# Patient Record
Sex: MALE | Race: WHITE | NOT HISPANIC OR LATINO
[De-identification: names, ages, dates, MRNs, and addresses within clinical notes are randomized per-mention and may not be internally consistent; named-entity substitution may affect disease eponyms.]

---

## 2018-05-25 PROBLEM — Z00.00 ENCOUNTER FOR PREVENTIVE HEALTH EXAMINATION: Status: ACTIVE | Noted: 2018-05-25

## 2018-08-08 ENCOUNTER — APPOINTMENT (OUTPATIENT)
Dept: CARDIOLOGY | Facility: CLINIC | Age: 68
End: 2018-08-08
Payer: COMMERCIAL

## 2018-08-08 ENCOUNTER — NON-APPOINTMENT (OUTPATIENT)
Age: 68
End: 2018-08-08

## 2018-08-08 VITALS
DIASTOLIC BLOOD PRESSURE: 81 MMHG | SYSTOLIC BLOOD PRESSURE: 143 MMHG | WEIGHT: 280 LBS | RESPIRATION RATE: 16 BRPM | HEIGHT: 74 IN | HEART RATE: 84 BPM | BODY MASS INDEX: 35.94 KG/M2 | OXYGEN SATURATION: 97 %

## 2018-08-08 DIAGNOSIS — R94.31 ABNORMAL ELECTROCARDIOGRAM [ECG] [EKG]: ICD-10-CM

## 2018-08-08 DIAGNOSIS — R93.1 ABNORMAL FINDINGS ON DIAGNOSTIC IMAGING OF HEART AND CORONARY CIRCULATION: ICD-10-CM

## 2018-08-08 PROCEDURE — 93000 ELECTROCARDIOGRAM COMPLETE: CPT

## 2018-08-08 PROCEDURE — 99214 OFFICE O/P EST MOD 30 MIN: CPT

## 2018-08-28 ENCOUNTER — NON-APPOINTMENT (OUTPATIENT)
Age: 68
End: 2018-08-28

## 2018-08-28 PROBLEM — R94.31 ABNORMAL EKG: Status: ACTIVE | Noted: 2018-08-28

## 2018-08-28 PROBLEM — R93.1 ABNORMAL ECHOCARDIOGRAM: Status: ACTIVE | Noted: 2018-08-28

## 2018-10-01 ENCOUNTER — APPOINTMENT (OUTPATIENT)
Dept: CARDIOLOGY | Facility: CLINIC | Age: 68
End: 2018-10-01
Payer: COMMERCIAL

## 2018-10-01 DIAGNOSIS — R94.39 ABNORMAL RESULT OF OTHER CARDIOVASCULAR FUNCTION STUDY: ICD-10-CM

## 2018-10-01 PROCEDURE — 78452 HT MUSCLE IMAGE SPECT MULT: CPT

## 2018-10-01 PROCEDURE — 93015 CV STRESS TEST SUPVJ I&R: CPT

## 2018-10-02 PROBLEM — R94.39 ABNORMAL STRESS TEST: Status: ACTIVE | Noted: 2018-10-02

## 2018-10-23 ENCOUNTER — OUTPATIENT (OUTPATIENT)
Dept: OUTPATIENT SERVICES | Facility: HOSPITAL | Age: 68
LOS: 1 days | End: 2018-10-23
Payer: COMMERCIAL

## 2018-10-23 VITALS
SYSTOLIC BLOOD PRESSURE: 155 MMHG | OXYGEN SATURATION: 98 % | RESPIRATION RATE: 16 BRPM | HEIGHT: 74 IN | DIASTOLIC BLOOD PRESSURE: 76 MMHG | TEMPERATURE: 98 F | HEART RATE: 70 BPM | WEIGHT: 285.06 LBS

## 2018-10-23 DIAGNOSIS — Z90.89 ACQUIRED ABSENCE OF OTHER ORGANS: Chronic | ICD-10-CM

## 2018-10-23 DIAGNOSIS — Z98.890 OTHER SPECIFIED POSTPROCEDURAL STATES: Chronic | ICD-10-CM

## 2018-10-23 DIAGNOSIS — D25.9 LEIOMYOMA OF UTERUS, UNSPECIFIED: Chronic | ICD-10-CM

## 2018-10-23 DIAGNOSIS — S92.402A DISPLACED UNSPECIFIED FRACTURE OF LEFT GREAT TOE, INITIAL ENCOUNTER FOR CLOSED FRACTURE: Chronic | ICD-10-CM

## 2018-10-23 DIAGNOSIS — R94.31 ABNORMAL ELECTROCARDIOGRAM [ECG] [EKG]: ICD-10-CM

## 2018-10-23 DIAGNOSIS — H33.23 SEROUS RETINAL DETACHMENT, BILATERAL: Chronic | ICD-10-CM

## 2018-10-23 LAB
ANION GAP SERPL CALC-SCNC: 13 MMOL/L — SIGNIFICANT CHANGE UP (ref 5–17)
BUN SERPL-MCNC: 30 MG/DL — HIGH (ref 7–23)
CALCIUM SERPL-MCNC: 9.4 MG/DL — SIGNIFICANT CHANGE UP (ref 8.4–10.5)
CHLORIDE SERPL-SCNC: 103 MMOL/L — SIGNIFICANT CHANGE UP (ref 96–108)
CO2 SERPL-SCNC: 25 MMOL/L — SIGNIFICANT CHANGE UP (ref 22–31)
CREAT SERPL-MCNC: 1.3 MG/DL — SIGNIFICANT CHANGE UP (ref 0.5–1.3)
GLUCOSE SERPL-MCNC: 113 MG/DL — HIGH (ref 70–99)
HCT VFR BLD CALC: 37.1 % — LOW (ref 39–50)
HGB BLD-MCNC: 12.6 G/DL — LOW (ref 13–17)
MCHC RBC-ENTMCNC: 29 PG — SIGNIFICANT CHANGE UP (ref 27–34)
MCHC RBC-ENTMCNC: 34.1 GM/DL — SIGNIFICANT CHANGE UP (ref 32–36)
MCV RBC AUTO: 85 FL — SIGNIFICANT CHANGE UP (ref 80–100)
PLATELET # BLD AUTO: 248 K/UL — SIGNIFICANT CHANGE UP (ref 150–400)
POTASSIUM SERPL-MCNC: 4.8 MMOL/L — SIGNIFICANT CHANGE UP (ref 3.5–5.3)
POTASSIUM SERPL-SCNC: 4.8 MMOL/L — SIGNIFICANT CHANGE UP (ref 3.5–5.3)
RBC # BLD: 4.36 M/UL — SIGNIFICANT CHANGE UP (ref 4.2–5.8)
RBC # FLD: 12.9 % — SIGNIFICANT CHANGE UP (ref 10.3–14.5)
SODIUM SERPL-SCNC: 141 MMOL/L — SIGNIFICANT CHANGE UP (ref 135–145)
WBC # BLD: 9.2 K/UL — SIGNIFICANT CHANGE UP (ref 3.8–10.5)
WBC # FLD AUTO: 9.2 K/UL — SIGNIFICANT CHANGE UP (ref 3.8–10.5)

## 2018-10-23 PROCEDURE — 99152 MOD SED SAME PHYS/QHP 5/>YRS: CPT | Mod: GC

## 2018-10-23 PROCEDURE — 93458 L HRT ARTERY/VENTRICLE ANGIO: CPT | Mod: 26,GC

## 2018-10-23 PROCEDURE — C1769: CPT

## 2018-10-23 PROCEDURE — 93458 L HRT ARTERY/VENTRICLE ANGIO: CPT

## 2018-10-23 PROCEDURE — 80048 BASIC METABOLIC PNL TOTAL CA: CPT

## 2018-10-23 PROCEDURE — 99152 MOD SED SAME PHYS/QHP 5/>YRS: CPT

## 2018-10-23 PROCEDURE — 99203 OFFICE O/P NEW LOW 30 MIN: CPT

## 2018-10-23 PROCEDURE — 93010 ELECTROCARDIOGRAM REPORT: CPT

## 2018-10-23 PROCEDURE — 85027 COMPLETE CBC AUTOMATED: CPT

## 2018-10-23 PROCEDURE — C1894: CPT

## 2018-10-23 PROCEDURE — 93005 ELECTROCARDIOGRAM TRACING: CPT

## 2018-10-23 PROCEDURE — C1887: CPT

## 2018-10-23 RX ORDER — CHOLECALCIFEROL (VITAMIN D3) 125 MCG
1 CAPSULE ORAL
Qty: 0 | Refills: 0 | COMMUNITY

## 2018-10-23 RX ORDER — OXYBUTYNIN CHLORIDE 5 MG
1 TABLET ORAL
Qty: 0 | Refills: 0 | COMMUNITY

## 2018-10-23 RX ORDER — LOSARTAN POTASSIUM 100 MG/1
1 TABLET, FILM COATED ORAL
Qty: 0 | Refills: 0 | COMMUNITY

## 2018-10-23 RX ORDER — SODIUM CHLORIDE 9 MG/ML
1000 INJECTION INTRAMUSCULAR; INTRAVENOUS; SUBCUTANEOUS
Qty: 0 | Refills: 0 | Status: DISCONTINUED | OUTPATIENT
Start: 2018-10-23 | End: 2018-11-07

## 2018-10-23 RX ORDER — LATANOPROST 0.05 MG/ML
1 SOLUTION/ DROPS OPHTHALMIC; TOPICAL
Qty: 0 | Refills: 0 | COMMUNITY

## 2018-10-23 RX ORDER — AMLODIPINE BESYLATE 2.5 MG/1
1 TABLET ORAL
Qty: 0 | Refills: 0 | COMMUNITY

## 2018-10-23 RX ORDER — SODIUM CHLORIDE 9 MG/ML
1000 INJECTION INTRAMUSCULAR; INTRAVENOUS; SUBCUTANEOUS
Qty: 0 | Refills: 0 | Status: COMPLETED | OUTPATIENT
Start: 2018-10-23 | End: 2018-10-23

## 2018-10-23 RX ADMIN — SODIUM CHLORIDE 387 MILLILITER(S): 9 INJECTION INTRAMUSCULAR; INTRAVENOUS; SUBCUTANEOUS at 15:41

## 2018-10-23 NOTE — H&P CARDIOLOGY - PSH
Absence of tonsil    Detached retina, bilateral    Fibroid  tongue /removed  Fracture of left great toe    History of Mohs micrographic surgery for squamous cell carcinoma in situ (SCCIS) of skin    Status post closed reduction with internal fixation  right leg

## 2018-10-23 NOTE — H&P CARDIOLOGY - FAMILY HISTORY
Father  Still living? No  Family history of lung cancer, Age at diagnosis: Age Unknown     Mother  Still living? No  Family history of hypertension in mother, Age at diagnosis: Age Unknown

## 2018-10-23 NOTE — H&P CARDIOLOGY - HISTORY OF PRESENT ILLNESS
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 Pt went to Cardiologist Dr. Ruelas for evaluation EKG was abnormal with old anterior infarct. borderline echo findings . Pt  complaints sob with exertion walking up several flights stairs, denies any CP  no palpitations .  Pt had recent Stress test on 8/8/18 no results noted . Now presents for scheduled Left heart cardiac catheterization. 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 Pt went to Cardiologist Dr. Ruelas for evaluation EKG was abnormal with old anterior infarct, borderline echo findings . Pt  complaints sob with exertion walking up several flights stairs, denies any CP  no palpitations no lightheadedness or dizziness noted  .  Pt had recent  + Stress test on 10/1/18 showed small moderate defect apical wall reversible with normal LV function.  Now presents for scheduled Left heart cardiac catheterization. Currently CP free no sob no palpitations no dyspnea noted .

## 2019-06-20 PROBLEM — I10 ESSENTIAL (PRIMARY) HYPERTENSION: Chronic | Status: ACTIVE | Noted: 2018-10-23

## 2019-06-20 PROBLEM — C44.92 SQUAMOUS CELL CARCINOMA OF SKIN, UNSPECIFIED: Chronic | Status: ACTIVE | Noted: 2018-10-23

## 2019-06-20 PROBLEM — G62.9 POLYNEUROPATHY, UNSPECIFIED: Chronic | Status: ACTIVE | Noted: 2018-10-23

## 2019-06-24 ENCOUNTER — APPOINTMENT (OUTPATIENT)
Dept: ORTHOPEDIC SURGERY | Facility: CLINIC | Age: 69
End: 2019-06-24
Payer: COMMERCIAL

## 2019-06-24 VITALS
HEIGHT: 74 IN | HEART RATE: 71 BPM | BODY MASS INDEX: 34.39 KG/M2 | DIASTOLIC BLOOD PRESSURE: 83 MMHG | SYSTOLIC BLOOD PRESSURE: 135 MMHG | WEIGHT: 268 LBS

## 2019-06-24 DIAGNOSIS — M51.37 OTHER INTERVERTEBRAL DISC DEGENERATION, LUMBOSACRAL REGION: ICD-10-CM

## 2019-06-24 DIAGNOSIS — M48.07 SPINAL STENOSIS, LUMBOSACRAL REGION: ICD-10-CM

## 2019-06-24 DIAGNOSIS — G62.9 POLYNEUROPATHY, UNSPECIFIED: ICD-10-CM

## 2019-06-24 DIAGNOSIS — G57.91 UNSPECIFIED MONONEUROPATHY OF RIGHT LOWER LIMB: ICD-10-CM

## 2019-06-24 DIAGNOSIS — Z85.828 PERSONAL HISTORY OF OTHER MALIGNANT NEOPLASM OF SKIN: ICD-10-CM

## 2019-06-24 DIAGNOSIS — M48.062 SPINAL STENOSIS, LUMBAR REGION WITH NEUROGENIC CLAUDICATION: ICD-10-CM

## 2019-06-24 DIAGNOSIS — G57.92 UNSPECIFIED MONONEUROPATHY OF LEFT LOWER LIMB: ICD-10-CM

## 2019-06-24 DIAGNOSIS — Z80.1 FAMILY HISTORY OF MALIGNANT NEOPLASM OF TRACHEA, BRONCHUS AND LUNG: ICD-10-CM

## 2019-06-24 PROCEDURE — 72110 X-RAY EXAM L-2 SPINE 4/>VWS: CPT

## 2019-06-24 PROCEDURE — 99205 OFFICE O/P NEW HI 60 MIN: CPT

## 2019-06-24 PROCEDURE — 72170 X-RAY EXAM OF PELVIS: CPT | Mod: 59

## 2019-06-24 NOTE — PHYSICAL EXAM
[Obese] : obese [Stooped] : stooped [] : Sensory: [LE] : 5/5 motor strength in bilateral lower extremities [1+] : left 1+ [SLR] : negative straight leg raise [Plantar Reflex Right Only] : absent on the right [Plantar Reflex Left Only] : absent on the left [de-identified] : The pt is awake, alert and oriented to self, place and time, is comfortable and in no acute distress. Inspection of neck, back and lower extremities bilaterally reveals no rashes or ecchymotic lesions.  There is no obvious abnormal spinal curvature in the sagittal and coronal planes. There is no tenderness over the cervical, thoracic or lumbar spine, or the paraspinal or upper and lower extremities musculature. There is no sacroiliac tenderness. No greater trochanteric tenderness bilaterally. No atrophy or abnormal movements noted in the upper or lower extremities. There is no swelling noted in the upper or lower extremities bilaterally. No cervical lymphadenopathy noted anteriorly. No joint laxity noted in the upper and lower extremity joints bilaterally.\par Hip range of motion is degrees internal rotation 30° external rotation without pain. Full range of motion of the shoulders bilaterally with no significant pain\par There is no groin pain with hip internal rotation and a negative HARDY test bilaterally.  [DTR Reflexes Clonus Of Left Ankle (___ Beats)] : absent on the left [de-identified] : flex to his mid tibia, ext 20 degrees no pain\par cannot tandem walk straight line [DTR Reflexes Clonus Of Right Ankle (___ Beats)] : absent on the right [de-identified] : 4 views lumbar spine obtained today demonstrate no significant scoliosis. Minimal asymmetric degeneration seen on the right side at L3-4. On the lateral projection significant degeneration seen at L5-S1 with loss of disc height endplates sclerosis. The degenerative changes he also L4-5 and minimal anterolisthesis at L4-5. Between flexion-extension no dynamic instability. No acute fractures.\par \par AP pelvis demonstrates enthesopathy with bilateral iliac crest. No acute fractures. No significant degeneration

## 2019-06-24 NOTE — DISCUSSION/SUMMARY
[Medication Risks Reviewed] : Medication risks reviewed [de-identified] : The patient has had nerve conduction studies suggesting peripheral neuropathy and based on his evaluation today a suspect one of neuropathy rather than spinal stenosis with lumbar radiculopathy. However given the history of prior spinal stenosis recommend an MRI lumbar spine to further evaluate this condition. Further treatment options were discussed after the MRI has been performed. Recommended he follow up with his neurologist to discuss further treatment options pending the MRI findings.\par \par The patient was educated regarding their condition, treatment options as well as prescribed course of treatment. \par Risks and benefits as well as alternatives to the proposed treatment were also provided to the patient \par They were given the opportunity to have all their questions answered to their satisfaction.\par \par Vital signs were reviewed with the patient and the patient was instructed to followup with their primary care provider for further management.\par \par Healthy lifestyle recommendations were also made including a tobacco free lifestyle, proper diet, and weight control.

## 2019-06-24 NOTE — CONSULT LETTER
[Dear  ___] : Dear  [unfilled], [Consult Letter:] : I had the pleasure of evaluating your patient, [unfilled]. [FreeTextEntry2] : Juan Herr [FreeTextEntry1] : Thank you for this referral. I have enclosed my note for your review. Please feel free to contact my office if you have additional questions regarding this patient.\par \par Regards,\par Waqar Aguilera MD, FACS, FAAOS\par \par  of Orthopaedic Surgery\par [employer] [employer] [employer] [facility] [employer]\par Spinal Reconstruction Surgery\par Minimally Invasive Spinal Surgery\par [facility]

## 2020-11-19 ENCOUNTER — TRANSCRIPTION ENCOUNTER (OUTPATIENT)
Age: 70
End: 2020-11-19

## 2020-11-19 ENCOUNTER — INPATIENT (INPATIENT)
Facility: HOSPITAL | Age: 70
LOS: 3 days | Discharge: HOME | DRG: 418 | End: 2020-11-23
Attending: SURGERY | Admitting: SURGERY
Payer: MEDICARE

## 2020-11-19 VITALS
SYSTOLIC BLOOD PRESSURE: 174 MMHG | HEART RATE: 96 BPM | RESPIRATION RATE: 19 BRPM | DIASTOLIC BLOOD PRESSURE: 95 MMHG | TEMPERATURE: 98 F | HEIGHT: 73 IN | WEIGHT: 279.99 LBS

## 2020-11-19 DIAGNOSIS — D25.9 LEIOMYOMA OF UTERUS, UNSPECIFIED: Chronic | ICD-10-CM

## 2020-11-19 DIAGNOSIS — H33.23 SEROUS RETINAL DETACHMENT, BILATERAL: Chronic | ICD-10-CM

## 2020-11-19 DIAGNOSIS — N32.81 OVERACTIVE BLADDER: ICD-10-CM

## 2020-11-19 DIAGNOSIS — Z90.89 ACQUIRED ABSENCE OF OTHER ORGANS: Chronic | ICD-10-CM

## 2020-11-19 DIAGNOSIS — H33.23 SEROUS RETINAL DETACHMENT, BILATERAL: ICD-10-CM

## 2020-11-19 DIAGNOSIS — N18.9 CHRONIC KIDNEY DISEASE, UNSPECIFIED: ICD-10-CM

## 2020-11-19 DIAGNOSIS — Z98.890 OTHER SPECIFIED POSTPROCEDURAL STATES: Chronic | ICD-10-CM

## 2020-11-19 DIAGNOSIS — S92.402A DISPLACED UNSPECIFIED FRACTURE OF LEFT GREAT TOE, INITIAL ENCOUNTER FOR CLOSED FRACTURE: Chronic | ICD-10-CM

## 2020-11-19 DIAGNOSIS — I34.1 NONRHEUMATIC MITRAL (VALVE) PROLAPSE: ICD-10-CM

## 2020-11-19 DIAGNOSIS — Z29.9 ENCOUNTER FOR PROPHYLACTIC MEASURES, UNSPECIFIED: ICD-10-CM

## 2020-11-19 DIAGNOSIS — G62.9 POLYNEUROPATHY, UNSPECIFIED: ICD-10-CM

## 2020-11-19 DIAGNOSIS — E78.5 HYPERLIPIDEMIA, UNSPECIFIED: ICD-10-CM

## 2020-11-19 DIAGNOSIS — I10 ESSENTIAL (PRIMARY) HYPERTENSION: ICD-10-CM

## 2020-11-19 DIAGNOSIS — K81.0 ACUTE CHOLECYSTITIS: ICD-10-CM

## 2020-11-19 LAB
ALBUMIN SERPL ELPH-MCNC: 3.7 G/DL — SIGNIFICANT CHANGE UP (ref 3.3–5)
ALP SERPL-CCNC: 61 U/L — SIGNIFICANT CHANGE UP (ref 40–120)
ALT FLD-CCNC: 22 U/L — SIGNIFICANT CHANGE UP (ref 12–78)
ANION GAP SERPL CALC-SCNC: 8 MMOL/L — SIGNIFICANT CHANGE UP (ref 5–17)
APPEARANCE UR: CLEAR — SIGNIFICANT CHANGE UP
APTT BLD: 32.8 SEC — SIGNIFICANT CHANGE UP (ref 27.5–35.5)
AST SERPL-CCNC: 17 U/L — SIGNIFICANT CHANGE UP (ref 15–37)
BASOPHILS # BLD AUTO: 0.04 K/UL — SIGNIFICANT CHANGE UP (ref 0–0.2)
BASOPHILS NFR BLD AUTO: 0.2 % — SIGNIFICANT CHANGE UP (ref 0–2)
BILIRUB SERPL-MCNC: 1.8 MG/DL — HIGH (ref 0.2–1.2)
BILIRUB UR-MCNC: NEGATIVE — SIGNIFICANT CHANGE UP
BUN SERPL-MCNC: 18 MG/DL — SIGNIFICANT CHANGE UP (ref 7–23)
CALCIUM SERPL-MCNC: 9.5 MG/DL — SIGNIFICANT CHANGE UP (ref 8.5–10.1)
CHLORIDE SERPL-SCNC: 100 MMOL/L — SIGNIFICANT CHANGE UP (ref 96–108)
CO2 SERPL-SCNC: 27 MMOL/L — SIGNIFICANT CHANGE UP (ref 22–31)
COLOR SPEC: YELLOW — SIGNIFICANT CHANGE UP
CREAT SERPL-MCNC: 1.3 MG/DL — SIGNIFICANT CHANGE UP (ref 0.5–1.3)
DIFF PNL FLD: ABNORMAL
EOSINOPHIL # BLD AUTO: 0.01 K/UL — SIGNIFICANT CHANGE UP (ref 0–0.5)
EOSINOPHIL NFR BLD AUTO: 0 % — SIGNIFICANT CHANGE UP (ref 0–6)
GLUCOSE SERPL-MCNC: 146 MG/DL — HIGH (ref 70–99)
GLUCOSE UR QL: NEGATIVE — SIGNIFICANT CHANGE UP
HCT VFR BLD CALC: 39.2 % — SIGNIFICANT CHANGE UP (ref 39–50)
HGB BLD-MCNC: 13.8 G/DL — SIGNIFICANT CHANGE UP (ref 13–17)
IMM GRANULOCYTES NFR BLD AUTO: 0.9 % — SIGNIFICANT CHANGE UP (ref 0–1.5)
INR BLD: 1.29 RATIO — HIGH (ref 0.88–1.16)
KETONES UR-MCNC: NEGATIVE — SIGNIFICANT CHANGE UP
LEUKOCYTE ESTERASE UR-ACNC: NEGATIVE — SIGNIFICANT CHANGE UP
LIDOCAIN IGE QN: 80 U/L — SIGNIFICANT CHANGE UP (ref 73–393)
LYMPHOCYTES # BLD AUTO: 1.35 K/UL — SIGNIFICANT CHANGE UP (ref 1–3.3)
LYMPHOCYTES # BLD AUTO: 5.2 % — LOW (ref 13–44)
MCHC RBC-ENTMCNC: 29.2 PG — SIGNIFICANT CHANGE UP (ref 27–34)
MCHC RBC-ENTMCNC: 35.2 GM/DL — SIGNIFICANT CHANGE UP (ref 32–36)
MCV RBC AUTO: 83.1 FL — SIGNIFICANT CHANGE UP (ref 80–100)
MONOCYTES # BLD AUTO: 2.11 K/UL — HIGH (ref 0–0.9)
MONOCYTES NFR BLD AUTO: 8.1 % — SIGNIFICANT CHANGE UP (ref 2–14)
NEUTROPHILS # BLD AUTO: 22.23 K/UL — HIGH (ref 1.8–7.4)
NEUTROPHILS NFR BLD AUTO: 85.6 % — HIGH (ref 43–77)
NITRITE UR-MCNC: NEGATIVE — SIGNIFICANT CHANGE UP
NRBC # BLD: 0 /100 WBCS — SIGNIFICANT CHANGE UP (ref 0–0)
PH UR: 6.5 — SIGNIFICANT CHANGE UP (ref 5–8)
PLATELET # BLD AUTO: 292 K/UL — SIGNIFICANT CHANGE UP (ref 150–400)
POTASSIUM SERPL-MCNC: 4 MMOL/L — SIGNIFICANT CHANGE UP (ref 3.5–5.3)
POTASSIUM SERPL-SCNC: 4 MMOL/L — SIGNIFICANT CHANGE UP (ref 3.5–5.3)
PROT SERPL-MCNC: 7.6 G/DL — SIGNIFICANT CHANGE UP (ref 6–8.3)
PROT UR-MCNC: 100
PROTHROM AB SERPL-ACNC: 14.9 SEC — HIGH (ref 10.6–13.6)
RBC # BLD: 4.72 M/UL — SIGNIFICANT CHANGE UP (ref 4.2–5.8)
RBC # FLD: 13 % — SIGNIFICANT CHANGE UP (ref 10.3–14.5)
SODIUM SERPL-SCNC: 135 MMOL/L — SIGNIFICANT CHANGE UP (ref 135–145)
SP GR SPEC: 1.01 — SIGNIFICANT CHANGE UP (ref 1.01–1.02)
UROBILINOGEN FLD QL: NEGATIVE — SIGNIFICANT CHANGE UP
WBC # BLD: 25.97 K/UL — HIGH (ref 3.8–10.5)
WBC # FLD AUTO: 25.97 K/UL — HIGH (ref 3.8–10.5)

## 2020-11-19 PROCEDURE — 76705 ECHO EXAM OF ABDOMEN: CPT | Mod: 26

## 2020-11-19 PROCEDURE — 99222 1ST HOSP IP/OBS MODERATE 55: CPT | Mod: GC,AI

## 2020-11-19 PROCEDURE — 74177 CT ABD & PELVIS W/CONTRAST: CPT | Mod: 26

## 2020-11-19 PROCEDURE — 93010 ELECTROCARDIOGRAM REPORT: CPT

## 2020-11-19 PROCEDURE — 99285 EMERGENCY DEPT VISIT HI MDM: CPT

## 2020-11-19 PROCEDURE — 71045 X-RAY EXAM CHEST 1 VIEW: CPT | Mod: 26

## 2020-11-19 RX ORDER — PANTOPRAZOLE SODIUM 20 MG/1
40 TABLET, DELAYED RELEASE ORAL DAILY
Refills: 0 | Status: DISCONTINUED | OUTPATIENT
Start: 2020-11-19 | End: 2020-11-20

## 2020-11-19 RX ORDER — ONDANSETRON 8 MG/1
4 TABLET, FILM COATED ORAL EVERY 6 HOURS
Refills: 0 | Status: DISCONTINUED | OUTPATIENT
Start: 2020-11-19 | End: 2020-11-20

## 2020-11-19 RX ORDER — SODIUM CHLORIDE 9 MG/ML
1000 INJECTION INTRAMUSCULAR; INTRAVENOUS; SUBCUTANEOUS
Refills: 0 | Status: DISCONTINUED | OUTPATIENT
Start: 2020-11-19 | End: 2020-11-20

## 2020-11-19 RX ORDER — ONDANSETRON 8 MG/1
4 TABLET, FILM COATED ORAL ONCE
Refills: 0 | Status: COMPLETED | OUTPATIENT
Start: 2020-11-19 | End: 2020-11-19

## 2020-11-19 RX ORDER — LOSARTAN POTASSIUM 100 MG/1
100 TABLET, FILM COATED ORAL DAILY
Refills: 0 | Status: DISCONTINUED | OUTPATIENT
Start: 2020-11-19 | End: 2020-11-20

## 2020-11-19 RX ORDER — ACETAMINOPHEN 500 MG
1000 TABLET ORAL EVERY 6 HOURS
Refills: 0 | Status: DISCONTINUED | OUTPATIENT
Start: 2020-11-19 | End: 2020-11-20

## 2020-11-19 RX ORDER — HYDROMORPHONE HYDROCHLORIDE 2 MG/ML
0.5 INJECTION INTRAMUSCULAR; INTRAVENOUS; SUBCUTANEOUS EVERY 4 HOURS
Refills: 0 | Status: DISCONTINUED | OUTPATIENT
Start: 2020-11-19 | End: 2020-11-20

## 2020-11-19 RX ORDER — PIPERACILLIN AND TAZOBACTAM 4; .5 G/20ML; G/20ML
3.38 INJECTION, POWDER, LYOPHILIZED, FOR SOLUTION INTRAVENOUS EVERY 8 HOURS
Refills: 0 | Status: DISCONTINUED | OUTPATIENT
Start: 2020-11-20 | End: 2020-11-20

## 2020-11-19 RX ORDER — SODIUM CHLORIDE 9 MG/ML
1000 INJECTION INTRAMUSCULAR; INTRAVENOUS; SUBCUTANEOUS ONCE
Refills: 0 | Status: COMPLETED | OUTPATIENT
Start: 2020-11-19 | End: 2020-11-19

## 2020-11-19 RX ORDER — LATANOPROST 0.05 MG/ML
1 SOLUTION/ DROPS OPHTHALMIC; TOPICAL AT BEDTIME
Refills: 0 | Status: DISCONTINUED | OUTPATIENT
Start: 2020-11-19 | End: 2020-11-20

## 2020-11-19 RX ORDER — PIPERACILLIN AND TAZOBACTAM 4; .5 G/20ML; G/20ML
3.38 INJECTION, POWDER, LYOPHILIZED, FOR SOLUTION INTRAVENOUS ONCE
Refills: 0 | Status: COMPLETED | OUTPATIENT
Start: 2020-11-19 | End: 2020-11-19

## 2020-11-19 RX ORDER — AMLODIPINE BESYLATE 2.5 MG/1
5 TABLET ORAL DAILY
Refills: 0 | Status: DISCONTINUED | OUTPATIENT
Start: 2020-11-19 | End: 2020-11-20

## 2020-11-19 RX ORDER — HYDROMORPHONE HYDROCHLORIDE 2 MG/ML
1 INJECTION INTRAMUSCULAR; INTRAVENOUS; SUBCUTANEOUS EVERY 4 HOURS
Refills: 0 | Status: DISCONTINUED | OUTPATIENT
Start: 2020-11-19 | End: 2020-11-20

## 2020-11-19 RX ORDER — OXYBUTYNIN CHLORIDE 5 MG
10 TABLET ORAL DAILY
Refills: 0 | Status: DISCONTINUED | OUTPATIENT
Start: 2020-11-19 | End: 2020-11-20

## 2020-11-19 RX ORDER — MORPHINE SULFATE 50 MG/1
4 CAPSULE, EXTENDED RELEASE ORAL ONCE
Refills: 0 | Status: DISCONTINUED | OUTPATIENT
Start: 2020-11-19 | End: 2020-11-19

## 2020-11-19 RX ADMIN — SODIUM CHLORIDE 75 MILLILITER(S): 9 INJECTION INTRAMUSCULAR; INTRAVENOUS; SUBCUTANEOUS at 22:42

## 2020-11-19 RX ADMIN — PIPERACILLIN AND TAZOBACTAM 3.38 GRAM(S): 4; .5 INJECTION, POWDER, LYOPHILIZED, FOR SOLUTION INTRAVENOUS at 21:43

## 2020-11-19 RX ADMIN — ONDANSETRON 4 MILLIGRAM(S): 8 TABLET, FILM COATED ORAL at 18:45

## 2020-11-19 RX ADMIN — Medication 1000 MILLIGRAM(S): at 22:40

## 2020-11-19 RX ADMIN — SODIUM CHLORIDE 1000 MILLILITER(S): 9 INJECTION INTRAMUSCULAR; INTRAVENOUS; SUBCUTANEOUS at 18:43

## 2020-11-19 RX ADMIN — LOSARTAN POTASSIUM 100 MILLIGRAM(S): 100 TABLET, FILM COATED ORAL at 22:40

## 2020-11-19 RX ADMIN — LATANOPROST 1 DROP(S): 0.05 SOLUTION/ DROPS OPHTHALMIC; TOPICAL at 22:40

## 2020-11-19 RX ADMIN — Medication 1000 MILLIGRAM(S): at 23:10

## 2020-11-19 RX ADMIN — SODIUM CHLORIDE 1000 MILLILITER(S): 9 INJECTION INTRAMUSCULAR; INTRAVENOUS; SUBCUTANEOUS at 19:45

## 2020-11-19 RX ADMIN — PIPERACILLIN AND TAZOBACTAM 200 GRAM(S): 4; .5 INJECTION, POWDER, LYOPHILIZED, FOR SOLUTION INTRAVENOUS at 21:13

## 2020-11-19 RX ADMIN — MORPHINE SULFATE 4 MILLIGRAM(S): 50 CAPSULE, EXTENDED RELEASE ORAL at 18:43

## 2020-11-19 NOTE — H&P ADULT - NSHPPHYSICALEXAM_GEN_ALL_CORE
PHYSICAL EXAM:  General: NAD, resting on stretcher  Head: Atraumatic, normocephalic  Neck: Supple  Heart: RRR. +S1/S2. No murmur, gallop, rub  Lungs: CTA B/L. Good inspiratory effort. No wheeze, rhonchi, rales.   Abdomen: Soft, protuberant. Very tender to palpation in RUQ. No rebound tenderness or guarding. +Haider's sign. +BS.  Ext: No calf tenderness b/l.  Neuro: A&Ox3, nonfocal. PHYSICAL EXAM:  General: NAD, resting on stretcher  Head: Atraumatic, normocephalic  Neck: Supple  Heart: RRR. +S1/S2.   Lungs: CTA B/L. Good inspiratory effort. No wheeze, rhonchi, rales.   Abdomen: Soft, protuberant. Very tender to palpation in RUQ. No rebound tenderness or guarding. +Haider's sign. +BS.  Ext: No calf tenderness b/l.  Neuro: A&Ox3, nonfocal.

## 2020-11-19 NOTE — ED ADULT NURSE NOTE - OBJECTIVE STATEMENT
Pt to Ed c/o right upper abdominal pain, denies N/V, last bowel movement yesterday, afebrile, skin intact.

## 2020-11-19 NOTE — ED ADULT TRIAGE NOTE - NS ED NURSE DIRECT TO ROOM YN
"  [facility]  Adult Nutrition  Consult Note    SUMMARY     Recommendations    Recommendation/Intervention: 1. Should pt be cleared for po diet, recommend renal diet with texture per SLP along with Novasource ONS TID. 2. Should pt require enteral feeding, initiate Novasource renal formula at 10ml/hr and advance 10ml Q4hrs to goal rate of 40ml/hr (provides 1920kcal, 87g pro, 691ml free water). Provide additional 500ml water flush/24 hrs. Hold for residuals >500ml.  Goals: 1. Pt to receive nutrition < 48 hrs.  Nutrition Goal Status: new  Communication of RD Recs: other (comment)(POC)    Reason for Assessment    Reason for Assessment: consult  Diagnosis: other (see comments)(GI bleed)  Relevant Medical History: CVA, uncontrolled HTN, ESRD on HD, DM2, chronic HF  General Information Comments: Pt s/p bloody emesis failed CRESENCIO, is NPO. Pt reports eating poorly PTA. Per chart, pt has experienced 24% (39 lb) wt loss over the last year. Pt unable to explain why he lost the wt; per pt, he just recently started on dialysis. Pt unaware of dietary restrictions in renal diet; provided basic education but pt could use reinforcement. Completed NFPE: pt has moderate muscle wasting of interosseous, temporal, clavicle, calves and quadriceps as well as overall subcutaneous fat loss, moderate triceps fat loss and protruding patellas.  Nutrition Discharge Planning: unable to assess at this time    Nutrition Risk Screen    Nutrition Risk Screen: no indicators present    Nutrition/Diet History    Do you have any cultural, spiritual, Amish conflicts, given your current situation?: none    Anthropometrics    Temp: 96.6 °F (35.9 °C)  Height Method: Stated  Height: 5' 6" (167.6 cm)  Height (inches): 66 in  Weight Method: Standard Scale  Weight: 55.3 kg (122 lb)  Weight (lb): 122 lb  Ideal Body Weight (IBW), Male: 142 lb  % Ideal Body Weight, Male (lb): 85.92 lb  BMI (Calculated): 19.7  Amputation %: 5.9  Total Amputation " %: 5.9       Lab/Procedures/Meds    Pertinent Labs Reviewed: reviewed  Pertinent Labs Comments: Cl 94, CO2 31, BUN 38, Creat 7.1, eGFR 9.2, Glu 132  Pertinent Medications Reviewed: reviewed  Pertinent Medications Comments: statin, famotidine, PPI, senna/colace, sevelamer carbonate    Physical Findings/Assessment    Overall Physical Appearance: loss of muscle mass, loss of subcutaneous fat, weak, amputee(left BKA)    Estimated/Assessed Needs    Weight Used For Calorie Calculations: 55.3 kg (121 lb 14.6 oz)  Energy Calorie Requirements (kcal): 1936  Energy Need Method: Kcal/kg(35 kcal/kg)  Protein Requirements: 83-94g  Weight Used For Protein Calculations: 55.3 kg (121 lb 14.6 oz)  RDA Method (mL): 1936  CHO Requirement: 50% total kcal      Nutrition Prescription Ordered    Current Diet Order: NPO    Evaluation of Received Nutrient/Fluid Intake    Energy Calories Required: not meeting needs  Protein Required: not meeting needs  % Intake of Estimated Energy Needs: 50 - 75 %  % Meal Intake: NPO    Nutrition Risk    Level of Risk/Frequency of Follow-up: high     Assessment and Plan  Severe Malnutrition in the context of Social/Environmental Circumstances    Related to (etiology):  Unknown etiology    Signs and Symptoms (as evidenced by):  Energy Intake: per pt, <75% intake over the last year  Body Fat Depletion: overall subcutaneous fat loss, moderate triceps fat loss and protruding patellas.  Muscle Mass Depletion:  moderate muscle wasting of interosseous, temporal, clavicle, calves and quadriceps  Weight Loss: 24% (39 lbs) x 1 year    Nutrition Diagnosis Status:  New     Monitor and Evaluation    Food and Nutrient Intake: energy intake, food and beverage intake, enteral nutrition intake  Food and Nutrient Adminstration: diet order, enteral and parenteral nutrition administration  Knowledge/Beliefs/Attitudes: food and nutrition knowledge/skill  Physical Activity and Function: factors affecting access to physical  activity  Anthropometric Measurements: weight, weight change, body mass index  Biochemical Data, Medical Tests and Procedures: electrolyte and renal panel, gastrointestinal profile, glucose/endocrine profile, inflammatory profile, lipid profile  Nutrition-Focused Physical Findings: overall appearance, extremities, muscles and bones, head and eyes, skin     Nutrition Follow-Up    RD Follow-up?: Yes     Yes

## 2020-11-19 NOTE — H&P ADULT - NSHPSOCIALHISTORY_GEN_ALL_CORE
Denies smoking or history of smoking. Admits to ETOH use occasionally/socially. Denies illicit drug use.

## 2020-11-19 NOTE — H&P ADULT - NSHPLABSRESULTS_GEN_ALL_CORE
LABS:                      13.8   25.97 )-----------( 292      ( 2020 18:34 )             39.2     135  |  100  |  18  ----------------------------<  146<H>  4.0   |  27  |  1.30    Ca    9.5      2020 18:34  TPro  7.6  /  Alb  3.7  /  TBili  1.8<H>  /  DBili  x   /  AST  17  /  ALT  22  /  AlkPhos  61  11-      Urinalysis Basic - ( 2020 20:38 )  Color: Yellow / Appearance: Clear / S.015 / pH: x  Gluc: x / Ketone: Negative  / Bili: Negative / Urobili: Negative   Blood: x / Protein: 100 / Nitrite: Negative   Leuk Esterase: Negative / RBC: 6-10 /HPF / WBC 0-2   Sq Epi: x / Non Sq Epi: Occasional / Bacteria: Occasional      RADIOLOGY/IMAGING:    < from: CT Abdomen and Pelvis w/ IV Cont (20 @ 21:03)  EXAM:  CT ABDOMEN AND PELVIS IC                        PROCEDURE DATE:  2020    INTERPRETATION:  CLINICAL INFORMATION: 70 years old. Male. RUQ pain, U/S poss cholecystitis.  COMPARISON: None.  PROCEDURE:  CT of the Abdomen and Pelvis was performed with intravenous contrast.  Intravenous contrast: 90 ml Omnipaque 350. 10 ml discarded.  Oral contrast: None.  Sagittal and coronal reformats were performed.    FINDINGS:  LOWER CHEST: Mild bibasilar atelectasis.  LIVER: Within normal limits.  BILE DUCTS: Normal caliber.  GALLBLADDER: Distended gallbladder containing calcified gallstones and with mild gallbladder wall thickening, pericholecystic fat stranding and trace pericholecystic fluid.  SPLEEN: Within normal limits.  PANCREAS: Within normal limits.  ADRENALS: Within normal limits.  KIDNEYS/URETERS: Enhance symmetrically. No hydronephrosis. Left renal cyst.  BLADDER: Within normal limits.  REPRODUCTIVE ORGANS: Prostate is not enlarged.  BOWEL: No bowel obstruction. Appendix is not definitively visualized.  PERITONEUM: No ascites.  VESSELS: Normal caliber abdominal aorta.  RETROPERITONEUM/LYMPH NODES: No lymphadenopathy.  ABDOMINAL WALL: Within normal limits.  BONES: Degenerative changes in the spine.    IMPRESSION:  Findings concerning for acute cholecystitis.    JENNYFER JACKSON MD; Attending Radiologist  This document has been electronically signed. 2020  9:13PM  < end of copied text >    < from: US Gallbladder (US Gallbladder .) (20 @ 18:56) >  EXAM:  US GALLBLADDER                        PROCEDURE DATE:  2020    INTERPRETATION:  Clinical data: Right upper quadrant pain.  COMPARISON: None.  TECHNIQUE: Sonography of the right upper quadrant.    FINDINGS:  Bile ducts: Normal caliber.  Gallbladder: Distended with debris and possible small calculi. Mildly thickened wall. Unable to assess for focal tenderness due to premedication.    IMPRESSION:  Findings suggestive of acute cholecystitis. Suggest confirmation with cholescintigraphy    HANSEL DC MD; Attending Radiologist  This document has been electronically signed. 2020  7:03PM

## 2020-11-19 NOTE — ED PROVIDER NOTE - OBJECTIVE STATEMENT
69 y/o M with hx of HTN, trace MVP, overactive bladder presents with c/o abdominal pain x 2 days. States that he had dinner on Tuesday night and few hours after started having abdominal pain and vomiting, took peptobismol and symptoms resolved by yesterday morning. States that he was pain free all day yesterday until 8:30pm last night when he started having upper abdominal pain which has been constant, feels "gassy", no other symptoms. Denies any N/V/D, CP, hx of abdominal surgeries, dysuria, fever, chills, recent travel, sick contacts. Last BM: yesterday.   pcp: Dr. Juan Herr

## 2020-11-19 NOTE — CONSULT NOTE ADULT - PROBLEM SELECTOR RECOMMENDATION 2
/95 in ER   continue home losartan 100 mg QD, amlodipine 5 mg QD with hold parameters /95 in ER , elevation likely multifactorial -   continue home losartan 100 mg QD, amlodipine 5 mg QD with hold parameters /95 in ER , elevation likely multifactorial - pt due for losartan dose. also anxious regarding possible need for surgery. give stat losartan now   continue home losartan 100 mg QD, amlodipine 5 mg QD with hold parameters

## 2020-11-19 NOTE — CONSULT NOTE ADULT - ASSESSMENT
Patient is a 71 y/o M with hx of HTN, trace MVP, overactive bladder, b/l detached retina repair, partial neuropathy in bilateral feet ( due to hx spinal compression), HLD admit for cholecystitis.

## 2020-11-19 NOTE — H&P ADULT - NSHPREVIEWOFSYSTEMS_GEN_ALL_CORE
REVIEW OF SYSTEMS:  CONSTITUTIONAL: No weakness, fevers or chills  EYES/ENT: No visual changes;  No vertigo or throat pain   NECK: No pain or stiffness  RESPIRATORY: No cough, wheezing, hemoptysis; No shortness of breath  CARDIOVASCULAR: No chest pain or palpitations  GASTROINTESTINAL: See HPI.   GENITOURINARY: No dysuria, frequency or hematuria  NEUROLOGICAL: No numbness or weakness  SKIN: No itching, burning, rashes, or lesions   All other review of systems is negative unless indicated above.

## 2020-11-19 NOTE — CONSULT NOTE ADULT - PROBLEM SELECTOR RECOMMENDATION 8
dvt ppx as per surgery   IMPROVE VTE Individual Risk Assessment        RISK                                                          Points  [  ] Previous VTE                                                3  [  ] Thrombophilia                                             2  [  ] Lower limb paralysis                                   2        (unable to hold up >15 seconds)    [  ] Current Cancer                                            2         (within 6 months)  [  ] Immobilization > 24 hrs                              1  [  ] ICU/CCU stay > 24 hours                            1  [ x ] Age > 60                                                    1  IMPROVE VTE Score ___1______ CKD stage 3 a  monitor CMP
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
## 2020-11-19 NOTE — CONSULT NOTE ADULT - PROBLEM SELECTOR RECOMMENDATION 3
/95 in ER   continue home losartan 100 mg QD, amlodipine 5 mg QD with hold parameters overactive bladder   continue home oxybutynin 10 mg ER qD

## 2020-11-19 NOTE — CONSULT NOTE ADULT - PROBLEM SELECTOR RECOMMENDATION 9
acute cholecystis  NPO after midnight acute cholecystis, wbc 25.9, 85% neutrophils, BR total 1.8  s/p zosyn IV x1, NS 1L bolus x1, morphine 4 mg IV x1, zofran 4 mg IV x1  CT abd and pelvis and US gb suggestive of acute cholecystitis  recommend NPO after midnight  recommend cotninue  zosyn 3.375 mg IV q 8 hrs  recommend zofran 4 mg IV q 6 hrs   recommend IVF rate 120 cc/hr while NPO   morphine 2 mg IV q 6 for moderate pain, morphine 4 mg IV q 6 hours for severe pain dvt ppx as per surgery   IMPROVE VTE Individual Risk Assessment        RISK                                                          Points  [  ] Previous VTE                                                3  [  ] Thrombophilia                                             2  [  ] Lower limb paralysis                                   2        (unable to hold up >15 seconds)    [  ] Current Cancer                                            2         (within 6 months)  [  ] Immobilization > 24 hrs                              1  [  ] ICU/CCU stay > 24 hours                            1  [ x ] Age > 60                                                    1  IMPROVE VTE Score ___1______ acute cholecystis, wbc 25.9, 85% neutrophils, BR total 1.8  s/p zosyn IV x1, NS 1L bolus x1, morphine 4 mg IV x1, zofran 4 mg IV x1  CT abd and pelvis and US gb suggestive of acute cholecystitis  NPO after midnight, zosyn 3.375 mg IV q 8 hrs,  zofran 4 mg IV q 6 hrs , IVF rate 120 cc/hr while NPO   morphine 2 mg IV q 6 for moderate pain, morphine 4 mg IV q 6 hours for severe pain  pt with no known history of ischemic disease. Appears euvolemic on exam and has no known history of heart failure. He has h/o trace mitral regurg. EKG NSR with no ischemic changes. Can perform >4MET. RCRI class 1 risk. Medically optimized to proceed with planned surgery pending adequate bp control. acute cholecystis, wbc 25.9, 85% neutrophils, BR total 1.8  s/p zosyn IV x1, NS 1L bolus x1, morphine 4 mg IV x1, zofran 4 mg IV x1  CT abd and pelvis and US gb suggestive of acute cholecystitis  NPO after midnight, zosyn 3.375 mg IV q 8 hrs,  zofran 4 mg IV q 6 hrs , IVF rate 120 cc/hr while NPO   morphine 2 mg IV q 6 for moderate pain, morphine 4 mg IV q 6 hours for severe pain  pt with no known history of ischemic disease. Appears euvolemic on exam and has no known history of heart failure. He has h/o MVP. EKG NSR with no ischemic changes. Can perform >4MET. RCRI class 1 risk. Medically optimized to proceed with planned surgery pending adequate bp control.

## 2020-11-19 NOTE — ED PROVIDER NOTE - CLINICAL SUMMARY MEDICAL DECISION MAKING FREE TEXT BOX
71 yo M with hx of htn co abdominal pain and vomiting 2 nights ago, resolved yesterday morning, and then started having constant upper abdominal pain last night, no n/v/d since yesterday, +ttp ruq and epigastric region, will get labs, UA, IVF, pain meds, gallbladder US r/o cholecystitis, possible CT scan abdomen and pelvis, reassess

## 2020-11-19 NOTE — ED PROVIDER NOTE - CROS ED ROS STATEMENT
----- Message from SAVANA Mendiola CNP sent at 1/7/2020  6:35 PM EST -----  Small benign appearing lymph node noted in axilla. Please let patient know results are benign.
Attempted to reach patient in regards to results/plan of care. Patient did not answer, voicemail left, awaiting call back.
all other ROS negative except as per HPI
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
## 2020-11-19 NOTE — H&P ADULT - NSICDXPASTMEDICALHX_GEN_ALL_CORE_FT
PAST MEDICAL HISTORY:  HTN (hypertension)     Overactive bladder     Peripheral neuropathy     SCC (squamous cell carcinoma)
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
## 2020-11-19 NOTE — ED PROVIDER NOTE - PROGRESS NOTE DETAILS
Paged surgeon, Dr. Healy, awaiting call back. Spoke to surgery PA, discussed with Dr. Healy, advised to get medicine consult, will need ct abdomen and pelvis and will discuss which service for admission after ct results. Spoke with surgical PA, Kim, advised to admit to surgery to Dr. Niraj scott.

## 2020-11-19 NOTE — CONSULT NOTE ADULT - PROBLEM SELECTOR RECOMMENDATION 6
hx of detached retina b/l, s/p repair  conitne home Latanoprost eye drops stable   takes COQ at home, no other medications  recommend lipid panel

## 2020-11-19 NOTE — H&P ADULT - HISTORY OF PRESENT ILLNESS
71 y/o M with PMHx of HTN, overactive bladder presents with abdominal pain x 2 days. Pain located in RUQ and described as constant ache with intermittent sharp pains, 7/10 in severity at its worst, nonradiating. Pain is currently 3/10 after pain medication given in ER. Patient states he overate on Tuesday night and vomited afterwards. He took Pepto-Bismol, which he reports improved his symptoms. On Wednesday, patient ate a very bland diet, but pain returned at 8:30pm. Last BM was yesterday and normal - no flatus or BM today. Patient reports feeling bloated and like he has "gas and gas pains." Denies fever, chills, chest pain, palpitations, shortness of breath, current nausea, urinary complaints. 71 y/o M with PMHx of HTN, overactive bladder, MVP, peripheral neuropathy, hx of bilateral detached retinas, presents with abdominal pain x 2 days. Pain located in RUQ and described as constant ache with intermittent sharp pains, 7/10 in severity at its worst, nonradiating. Pain is currently 3/10 after pain medication given in ER. Patient states he overate on Tuesday night and vomited afterwards. He took Pepto-Bismol, which he reports improved his symptoms. On Wednesday, patient ate a very bland diet, but pain returned at 8:30pm. Last BM was yesterday and normal - no flatus or BM today. Patient reports feeling bloated and like he has "gas and gas pains." Denies fever, chills, chest pain, palpitations, shortness of breath, current nausea, urinary complaints.

## 2020-11-19 NOTE — ED PROVIDER NOTE - ATTENDING CONTRIBUTION TO CARE
I have personally performed a face to face diagnostic evaluation on this patient.  I have reviewed the PA note and agree with the history, exam, and plan of care, except as noted.  History and Exam by me shows  ruq and epigastric pain x 2 days.  No fever.  pt is in nad.  abd- soft, pos bach. wbc 24.8, sono cw acute cholecystitis.  Admit to Dr. Healy

## 2020-11-19 NOTE — H&P ADULT - NSICDXPASTSURGICALHX_GEN_ALL_CORE_FT
PAST SURGICAL HISTORY:  Absence of tonsil     Detached retina, bilateral     Fibroid tongue /removed    Fracture of left great toe     History of Mohs micrographic surgery for squamous cell carcinoma in situ (SCCIS) of skin     Status post closed reduction with internal fixation right leg

## 2020-11-19 NOTE — H&P ADULT - ASSESSMENT
ASSESSMENT:   71 y/o M with PMHx of HTN, overactive bladder presents with RUQ abdominal pain x 2 days, CT and US show findings consistent with acute cholecystitis, WBC 25, Tbili 1.8, afebrile, VSS      PLAN:  -Admit to surgical service  -Pain control, supportive care, OOB  -IV Zosyn  -NPO, IV fluids  -SCDs  -Labs in AM, trend WBC, Tbili/LFTs  -Hospitalist consult for medical management and OR clearance   -Possible OR for laparoscopic cholecystectomy   -Discussed with Dr. Healy ASSESSMENT:   71 y/o M with PMHx of HTN, overactive bladder, MVP, peripheral neuropathy, hx of bilateral detached retinas, presents with RUQ abdominal pain x 2 days, CT and US show findings consistent with acute cholecystitis, WBC 25, Tbili 1.8, afebrile, VSS      PLAN:  -Admit to surgical service  -Pain control, supportive care, OOB  -IV Zosyn  -NPO, IV fluids  -SCDs  -Labs in AM, trend WBC, Tbili/LFTs  -Hospitalist consult for medical management and OR clearance   -Possible OR for laparoscopic cholecystectomy   -Discussed with Dr. Healy

## 2020-11-20 ENCOUNTER — RESULT REVIEW (OUTPATIENT)
Age: 70
End: 2020-11-20

## 2020-11-20 DIAGNOSIS — Z01.818 ENCOUNTER FOR OTHER PREPROCEDURAL EXAMINATION: ICD-10-CM

## 2020-11-20 LAB
ALBUMIN SERPL ELPH-MCNC: 3.2 G/DL — LOW (ref 3.3–5)
ALP SERPL-CCNC: 56 U/L — SIGNIFICANT CHANGE UP (ref 40–120)
ALT FLD-CCNC: 17 U/L — SIGNIFICANT CHANGE UP (ref 12–78)
ANION GAP SERPL CALC-SCNC: 6 MMOL/L — SIGNIFICANT CHANGE UP (ref 5–17)
AST SERPL-CCNC: 14 U/L — LOW (ref 15–37)
BILIRUB SERPL-MCNC: 1.7 MG/DL — HIGH (ref 0.2–1.2)
BUN SERPL-MCNC: 18 MG/DL — SIGNIFICANT CHANGE UP (ref 7–23)
CALCIUM SERPL-MCNC: 8.7 MG/DL — SIGNIFICANT CHANGE UP (ref 8.5–10.1)
CHLORIDE SERPL-SCNC: 103 MMOL/L — SIGNIFICANT CHANGE UP (ref 96–108)
CO2 SERPL-SCNC: 29 MMOL/L — SIGNIFICANT CHANGE UP (ref 22–31)
CREAT SERPL-MCNC: 1.4 MG/DL — HIGH (ref 0.5–1.3)
GLUCOSE SERPL-MCNC: 143 MG/DL — HIGH (ref 70–99)
HCT VFR BLD CALC: 38.1 % — LOW (ref 39–50)
HCV AB S/CO SERPL IA: 0.1 S/CO — SIGNIFICANT CHANGE UP (ref 0–0.99)
HCV AB SERPL-IMP: SIGNIFICANT CHANGE UP
HGB BLD-MCNC: 12.8 G/DL — LOW (ref 13–17)
MCHC RBC-ENTMCNC: 28.7 PG — SIGNIFICANT CHANGE UP (ref 27–34)
MCHC RBC-ENTMCNC: 33.6 GM/DL — SIGNIFICANT CHANGE UP (ref 32–36)
MCV RBC AUTO: 85.4 FL — SIGNIFICANT CHANGE UP (ref 80–100)
NRBC # BLD: 0 /100 WBCS — SIGNIFICANT CHANGE UP (ref 0–0)
PLATELET # BLD AUTO: 228 K/UL — SIGNIFICANT CHANGE UP (ref 150–400)
POTASSIUM SERPL-MCNC: 4.3 MMOL/L — SIGNIFICANT CHANGE UP (ref 3.5–5.3)
POTASSIUM SERPL-SCNC: 4.3 MMOL/L — SIGNIFICANT CHANGE UP (ref 3.5–5.3)
PROT SERPL-MCNC: 6.8 G/DL — SIGNIFICANT CHANGE UP (ref 6–8.3)
RBC # BLD: 4.46 M/UL — SIGNIFICANT CHANGE UP (ref 4.2–5.8)
RBC # FLD: 13 % — SIGNIFICANT CHANGE UP (ref 10.3–14.5)
SARS-COV-2 IGG SERPL QL IA: NEGATIVE — SIGNIFICANT CHANGE UP
SARS-COV-2 IGM SERPL IA-ACNC: <0.1 INDEX — SIGNIFICANT CHANGE UP
SARS-COV-2 RNA SPEC QL NAA+PROBE: SIGNIFICANT CHANGE UP
SODIUM SERPL-SCNC: 138 MMOL/L — SIGNIFICANT CHANGE UP (ref 135–145)
WBC # BLD: 24.84 K/UL — HIGH (ref 3.8–10.5)
WBC # FLD AUTO: 24.84 K/UL — HIGH (ref 3.8–10.5)

## 2020-11-20 PROCEDURE — 99222 1ST HOSP IP/OBS MODERATE 55: CPT

## 2020-11-20 PROCEDURE — 47562 LAPAROSCOPIC CHOLECYSTECTOMY: CPT | Mod: 22

## 2020-11-20 PROCEDURE — 99223 1ST HOSP IP/OBS HIGH 75: CPT | Mod: 57

## 2020-11-20 PROCEDURE — 47562 LAPAROSCOPIC CHOLECYSTECTOMY: CPT | Mod: AS,22

## 2020-11-20 PROCEDURE — 99233 SBSQ HOSP IP/OBS HIGH 50: CPT

## 2020-11-20 PROCEDURE — 88304 TISSUE EXAM BY PATHOLOGIST: CPT | Mod: 26

## 2020-11-20 RX ORDER — OXYCODONE HYDROCHLORIDE 5 MG/1
5 TABLET ORAL EVERY 4 HOURS
Refills: 0 | Status: DISCONTINUED | OUTPATIENT
Start: 2020-11-20 | End: 2020-11-22

## 2020-11-20 RX ORDER — HYDROMORPHONE HYDROCHLORIDE 2 MG/ML
0.5 INJECTION INTRAMUSCULAR; INTRAVENOUS; SUBCUTANEOUS
Refills: 0 | Status: DISCONTINUED | OUTPATIENT
Start: 2020-11-20 | End: 2020-11-20

## 2020-11-20 RX ORDER — HYDROMORPHONE HYDROCHLORIDE 2 MG/ML
1 INJECTION INTRAMUSCULAR; INTRAVENOUS; SUBCUTANEOUS
Refills: 0 | Status: DISCONTINUED | OUTPATIENT
Start: 2020-11-20 | End: 2020-11-22

## 2020-11-20 RX ORDER — ONDANSETRON 8 MG/1
4 TABLET, FILM COATED ORAL ONCE
Refills: 0 | Status: DISCONTINUED | OUTPATIENT
Start: 2020-11-20 | End: 2020-11-20

## 2020-11-20 RX ORDER — SODIUM CHLORIDE 9 MG/ML
1000 INJECTION, SOLUTION INTRAVENOUS
Refills: 0 | Status: DISCONTINUED | OUTPATIENT
Start: 2020-11-20 | End: 2020-11-20

## 2020-11-20 RX ORDER — PANTOPRAZOLE SODIUM 20 MG/1
40 TABLET, DELAYED RELEASE ORAL DAILY
Refills: 0 | Status: DISCONTINUED | OUTPATIENT
Start: 2020-11-20 | End: 2020-11-22

## 2020-11-20 RX ORDER — SODIUM CHLORIDE 9 MG/ML
1000 INJECTION, SOLUTION INTRAVENOUS
Refills: 0 | Status: DISCONTINUED | OUTPATIENT
Start: 2020-11-20 | End: 2020-11-21

## 2020-11-20 RX ORDER — ACETAMINOPHEN 500 MG
650 TABLET ORAL EVERY 6 HOURS
Refills: 0 | Status: DISCONTINUED | OUTPATIENT
Start: 2020-11-20 | End: 2020-11-22

## 2020-11-20 RX ORDER — INFLUENZA VIRUS VACCINE 15; 15; 15; 15 UG/.5ML; UG/.5ML; UG/.5ML; UG/.5ML
0.5 SUSPENSION INTRAMUSCULAR ONCE
Refills: 0 | Status: DISCONTINUED | OUTPATIENT
Start: 2020-11-20 | End: 2020-11-23

## 2020-11-20 RX ORDER — SENNA PLUS 8.6 MG/1
2 TABLET ORAL AT BEDTIME
Refills: 0 | Status: DISCONTINUED | OUTPATIENT
Start: 2020-11-20 | End: 2020-11-23

## 2020-11-20 RX ORDER — PIPERACILLIN AND TAZOBACTAM 4; .5 G/20ML; G/20ML
3.38 INJECTION, POWDER, LYOPHILIZED, FOR SOLUTION INTRAVENOUS EVERY 8 HOURS
Refills: 0 | Status: DISCONTINUED | OUTPATIENT
Start: 2020-11-20 | End: 2020-11-23

## 2020-11-20 RX ORDER — ENOXAPARIN SODIUM 100 MG/ML
40 INJECTION SUBCUTANEOUS DAILY
Refills: 0 | Status: DISCONTINUED | OUTPATIENT
Start: 2020-11-21 | End: 2020-11-23

## 2020-11-20 RX ORDER — OXYCODONE HYDROCHLORIDE 5 MG/1
5 TABLET ORAL ONCE
Refills: 0 | Status: DISCONTINUED | OUTPATIENT
Start: 2020-11-20 | End: 2020-11-20

## 2020-11-20 RX ADMIN — PIPERACILLIN AND TAZOBACTAM 25 GRAM(S): 4; .5 INJECTION, POWDER, LYOPHILIZED, FOR SOLUTION INTRAVENOUS at 06:32

## 2020-11-20 RX ADMIN — PIPERACILLIN AND TAZOBACTAM 25 GRAM(S): 4; .5 INJECTION, POWDER, LYOPHILIZED, FOR SOLUTION INTRAVENOUS at 23:00

## 2020-11-20 RX ADMIN — AMLODIPINE BESYLATE 5 MILLIGRAM(S): 2.5 TABLET ORAL at 06:32

## 2020-11-20 RX ADMIN — SENNA PLUS 2 TABLET(S): 8.6 TABLET ORAL at 23:00

## 2020-11-20 NOTE — PROGRESS NOTE ADULT - PROBLEM SELECTOR PLAN 2
pt is medically optimized at this time, considered a intermediate risk candidate for a low to intermediate risk procedure, apprec cardio optimization given mvp hx and periopertive monitoring

## 2020-11-20 NOTE — CONSULT NOTE ADULT - SUBJECTIVE AND OBJECTIVE BOX
Patient is a 70y old  Male who presents with a chief complaint of acute cholecystitis (2020 08:29)    Charting in progress    HPI:  71 y/o M with PMHx of HTN, overactive bladder, MVP, peripheral neuropathy, hx of bilateral detached retinas, presents with abdominal pain x 2 days. Pain located in RUQ and described as constant ache with intermittent sharp pains, 7/10 in severity at its worst, nonradiating. Pain is currently 3/10 after pain medication given in ER. Patient states he overate on Tuesday night and vomited afterwards. He took Pepto-Bismol, which he reports improved his symptoms. On Wednesday, patient ate a very bland diet, but pain returned at 8:30pm. Last BM was yesterday and normal - no flatus or BM today. Patient reports feeling bloated and like he has "gas and gas pains." Denies fever, chills, chest pain, palpitations, shortness of breath, current nausea, urinary complaints.  (2020 21:43)      PAST MEDICAL & SURGICAL HISTORY:  Overactive bladder    SCC (squamous cell carcinoma)    Peripheral neuropathy    HTN (hypertension)    Fibroid  tongue /removed    Fracture of left great toe    Status post closed reduction with internal fixation  right leg    Absence of tonsil    Detached retina, bilateral    History of Mohs micrographic surgery for squamous cell carcinoma in situ (SCCIS) of skin              ECHO  FINDINGS:  2018:  Mild concentric left ventricular hypertrophy. Normal global systolic function. LVEF is 60-65% by visual estimation. There are no significant regional wall motion abnormalities. Grade 1 diastolic dysfunction.    MEDICATIONS  (STANDING):  amLODIPine   Tablet 5 milliGRAM(s) Oral daily  influenza   Vaccine 0.5 milliLiter(s) IntraMuscular once  latanoprost 0.005% Ophthalmic Solution 1 Drop(s) Both EYES at bedtime  losartan 100 milliGRAM(s) Oral daily  oxybutynin 10 milliGRAM(s) Oral daily  pantoprazole  Injectable 40 milliGRAM(s) IV Push daily  piperacillin/tazobactam IVPB.. 3.375 Gram(s) IV Intermittent every 8 hours  sodium chloride 0.9%. 1000 milliLiter(s) (100 mL/Hr) IV Continuous <Continuous>    MEDICATIONS  (PRN):  acetaminophen   Tablet .. 1000 milliGRAM(s) Oral every 6 hours PRN Mild Pain (1 - 3)  HYDROmorphone  Injectable 1 milliGRAM(s) IV Push every 4 hours PRN Severe Pain (7 - 10)  HYDROmorphone  Injectable 0.5 milliGRAM(s) IV Push every 4 hours PRN Moderate Pain (4 - 6)  ondansetron Injectable 4 milliGRAM(s) IV Push every 6 hours PRN Nausea and/or Vomiting      FAMILY HISTORY:  Family history of hypertension in mother (Mother)    Family history of lung cancer (Father)          ROS negative except as noted above      SOCIAL HISTORY:    never smoker, etoh socially, denies other drug use    Vital Signs Last 24 Hrs  T(C): 36.8 (2020 06:03), Max: 37.2 (2020 21:55)  T(F): 98.3 (2020 06:03), Max: 98.9 (2020 21:55)  HR: 77 (2020 06:03) (77 - 96)  BP: 154/72 (2020 06:03) (147/79 - 174/95)  BP(mean): --  RR: 16 (2020 06:03) (16 - 19)  SpO2: 94% (2020 06:03) (94% - 96%)    Physical Exam:  General: Well developed, well nourished, NAD  HEENT: NCAT, EOMI bl  Neurology: A&Ox3, nonfocal, sensation intact   Respiratory: CTA B/L, No W/R/R  CV: RRR, +S1/S2, no murmurs, rubs or gallops  Abdominal: Soft, NT, ND +BSx4, no palpable masses  Extremities: No C/C/E, + peripheral pulses  MSK: Normal ROM, no joint erythema or warmth, no joint swelling   Heme: No obvious ecchymosis or petechiae   Skin: warm, dry, normal color      ECG:    I&O's Detail      LABS:                        12.8   24.84 )-----------( 228      ( 2020 05:01 )             38.1     11-    138  |  103  |  18  ----------------------------<  143<H>  4.3   |  29  |  1.40<H>    Ca    8.7      2020 05:01    TPro  6.8  /  Alb  3.2<L>  /  TBili  1.7<H>  /  DBili  x   /  AST  14<L>  /  ALT  17  /  AlkPhos  56  11-20        PT/INR - ( 2020 20:15 )   PT: 14.9 sec;   INR: 1.29 ratio         PTT - ( 2020 20:15 )  PTT:32.8 sec  Urinalysis Basic - ( 2020 20:38 )    Color: Yellow / Appearance: Clear / S.015 / pH: x  Gluc: x / Ketone: Negative  / Bili: Negative / Urobili: Negative   Blood: x / Protein: 100 / Nitrite: Negative   Leuk Esterase: Negative / RBC: 6-10 /HPF / WBC 0-2   Sq Epi: x / Non Sq Epi: Occasional / Bacteria: Occasional      I&O's Summary    BNP  RADIOLOGY & ADDITIONAL STUDIES:  Cardiac cath lab results 2018:  VENTRICLES: No left ventriculogram was performed.  CORONARY VESSELS: The coronary circulation is right dominant.  LM:   --  LM: Normal.  LAD:   --  LAD: Angiography showed minor luminal irregularities with no  flow limiting lesions.  CX:   --  Circumflex: Angiography showed minor luminal irregularities with  no flow limiting lesions.  --  OM1: Angiography showed minor luminal irregularities with no flow  limiting lesions.  RCA:   --  Mid RCA: There was a diffuse 30 % stenosis.  COMPLICATIONS: There were no complications.  DIAGNOSTIC IMPRESSIONS: Mild CAD.  DIAGNOSTIC RECOMMENDATIONS: Medical therapy.   Patient is a 70y old  Male who presents with a chief complaint of acute cholecystitis (2020 08:29)    Charting in progress    HPI:  69 y/o M with PMHx of HTN, overactive bladder, MVP, peripheral neuropathy, hx of bilateral detached retinas, presents with abdominal pain x 2 days. Pain located in RUQ and described as constant ache with intermittent sharp pains, 7/10 in severity at its worst, nonradiating. Pain is currently 3/10 after pain medication given in ER. Patient states he overate on Tuesday night and vomited afterwards. He took Pepto-Bismol, which he reports improved his symptoms. On Wednesday, patient ate a very bland diet, but pain returned at 8:30pm. Last BM was yesterday and normal - no flatus or BM today. Patient reports feeling bloated and like he has "gas and gas pains." Denies fever, chills, chest pain, palpitations, shortness of breath, current nausea, urinary complaints.  (2020 21:43)      PAST MEDICAL & SURGICAL HISTORY:  Overactive bladder    SCC (squamous cell carcinoma)    Peripheral neuropathy    HTN (hypertension)    Fibroid  tongue /removed    Fracture of left great toe    Status post closed reduction with internal fixation  right leg    Absence of tonsil    Detached retina, bilateral    History of Mohs micrographic surgery for squamous cell carcinoma in situ (SCCIS) of skin              ECHO  FINDINGS:  2018:  Mild concentric left ventricular hypertrophy. Normal global systolic function. LVEF is 60-65% by visual estimation. There are no significant regional wall motion abnormalities. Grade 1 diastolic dysfunction.    MEDICATIONS  (STANDING):  amLODIPine   Tablet 5 milliGRAM(s) Oral daily  influenza   Vaccine 0.5 milliLiter(s) IntraMuscular once  latanoprost 0.005% Ophthalmic Solution 1 Drop(s) Both EYES at bedtime  losartan 100 milliGRAM(s) Oral daily  oxybutynin 10 milliGRAM(s) Oral daily  pantoprazole  Injectable 40 milliGRAM(s) IV Push daily  piperacillin/tazobactam IVPB.. 3.375 Gram(s) IV Intermittent every 8 hours  sodium chloride 0.9%. 1000 milliLiter(s) (100 mL/Hr) IV Continuous <Continuous>    MEDICATIONS  (PRN):  acetaminophen   Tablet .. 1000 milliGRAM(s) Oral every 6 hours PRN Mild Pain (1 - 3)  HYDROmorphone  Injectable 1 milliGRAM(s) IV Push every 4 hours PRN Severe Pain (7 - 10)  HYDROmorphone  Injectable 0.5 milliGRAM(s) IV Push every 4 hours PRN Moderate Pain (4 - 6)  ondansetron Injectable 4 milliGRAM(s) IV Push every 6 hours PRN Nausea and/or Vomiting      FAMILY HISTORY:  Family history of hypertension in mother (Mother)    Family history of lung cancer (Father)          ROS negative except as noted above      SOCIAL HISTORY:    never smoker, etoh socially, denies other drug use    Vital Signs Last 24 Hrs  T(C): 36.8 (2020 06:03), Max: 37.2 (2020 21:55)  T(F): 98.3 (2020 06:03), Max: 98.9 (2020 21:55)  HR: 77 (2020 06:03) (77 - 96)  BP: 154/72 (2020 06:03) (147/79 - 174/95)  BP(mean): --  RR: 16 (2020 06:03) (16 - 19)  SpO2: 94% (2020 06:03) (94% - 96%)    Physical Exam:  General: Well developed, well nourished, NAD  HEENT: NCAT, EOMI bl  Neurology: A&Ox3, nonfocal, sensation intact   Respiratory: CTA B/L, No W/R/R  CV: RRR, +S1/S2, no murmurs, rubs or gallops  Abdominal: Soft, tender to palpation, no palpable masses  Extremities: No C/C/E, + peripheral pulses  MSK: Normal ROM, no joint erythema or warmth, no joint swelling   Heme: No obvious ecchymosis or petechiae   Skin: warm, dry, normal color      ECG: NSR    I&O's Detail      LABS:                        12.8   24.84 )-----------( 228      ( 2020 05:01 )             38.1     11-    138  |  103  |  18  ----------------------------<  143<H>  4.3   |  29  |  1.40<H>    Ca    8.7      2020 05:01    TPro  6.8  /  Alb  3.2<L>  /  TBili  1.7<H>  /  DBili  x   /  AST  14<L>  /  ALT  17  /  AlkPhos  56  11-20        PT/INR - ( 2020 20:15 )   PT: 14.9 sec;   INR: 1.29 ratio         PTT - ( 2020 20:15 )  PTT:32.8 sec  Urinalysis Basic - ( 2020 20:38 )    Color: Yellow / Appearance: Clear / S.015 / pH: x  Gluc: x / Ketone: Negative  / Bili: Negative / Urobili: Negative   Blood: x / Protein: 100 / Nitrite: Negative   Leuk Esterase: Negative / RBC: 6-10 /HPF / WBC 0-2   Sq Epi: x / Non Sq Epi: Occasional / Bacteria: Occasional      I&O's Summary    BNP  RADIOLOGY & ADDITIONAL STUDIES:  Cardiac cath lab results 2018:  VENTRICLES: No left ventriculogram was performed.  CORONARY VESSELS: The coronary circulation is right dominant.  LM:   --  LM: Normal.  LAD:   --  LAD: Angiography showed minor luminal irregularities with no  flow limiting lesions.  CX:   --  Circumflex: Angiography showed minor luminal irregularities with  no flow limiting lesions.  --  OM1: Angiography showed minor luminal irregularities with no flow  limiting lesions.  RCA:   --  Mid RCA: There was a diffuse 30 % stenosis.  COMPLICATIONS: There were no complications.  DIAGNOSTIC IMPRESSIONS: Mild CAD.  DIAGNOSTIC RECOMMENDATIONS: Medical therapy.   Patient is a 70y old  Male who presents with a chief complaint of acute cholecystitis (2020 08:29)    HPI:  69 y/o M with PMHx of HTN, overactive bladder, MVP, peripheral neuropathy, hx of bilateral detached retinas, presents with abdominal pain x 2 days. Pain located in RUQ and described as constant ache with intermittent sharp pains, 7/10 in severity at its worst, nonradiating. Pain is currently 3/10 after pain medication given in ER. Patient states he overate on Tuesday night and vomited afterwards. He took Pepto-Bismol, which he reports improved his symptoms. On Wednesday, patient ate a very bland diet, but pain returned at 8:30pm. Last BM was yesterday and normal - no flatus or BM today. Patient reports feeling bloated and like he has "gas and gas pains." Denies fever, chills, chest pain, palpitations, shortness of breath, current nausea, urinary complaints.  (2020 21:43)      PAST MEDICAL & SURGICAL HISTORY:  Overactive bladder    SCC (squamous cell carcinoma)    Peripheral neuropathy    HTN (hypertension)    Fibroid  tongue /removed    Fracture of left great toe    Status post closed reduction with internal fixation  right leg    Absence of tonsil    Detached retina, bilateral    History of Mohs micrographic surgery for squamous cell carcinoma in situ (SCCIS) of skin              ECHO  FINDINGS:  2018:  Mild concentric left ventricular hypertrophy. Normal global systolic function. LVEF is 60-65% by visual estimation. There are no significant regional wall motion abnormalities. Grade 1 diastolic dysfunction.    MEDICATIONS  (STANDING):  amLODIPine   Tablet 5 milliGRAM(s) Oral daily  influenza   Vaccine 0.5 milliLiter(s) IntraMuscular once  latanoprost 0.005% Ophthalmic Solution 1 Drop(s) Both EYES at bedtime  losartan 100 milliGRAM(s) Oral daily  oxybutynin 10 milliGRAM(s) Oral daily  pantoprazole  Injectable 40 milliGRAM(s) IV Push daily  piperacillin/tazobactam IVPB.. 3.375 Gram(s) IV Intermittent every 8 hours  sodium chloride 0.9%. 1000 milliLiter(s) (100 mL/Hr) IV Continuous <Continuous>    MEDICATIONS  (PRN):  acetaminophen   Tablet .. 1000 milliGRAM(s) Oral every 6 hours PRN Mild Pain (1 - 3)  HYDROmorphone  Injectable 1 milliGRAM(s) IV Push every 4 hours PRN Severe Pain (7 - 10)  HYDROmorphone  Injectable 0.5 milliGRAM(s) IV Push every 4 hours PRN Moderate Pain (4 - 6)  ondansetron Injectable 4 milliGRAM(s) IV Push every 6 hours PRN Nausea and/or Vomiting      FAMILY HISTORY:  Family history of hypertension in mother (Mother)    Family history of lung cancer (Father)          ROS negative except as noted above      SOCIAL HISTORY:    never smoker, etoh socially, denies other drug use    Vital Signs Last 24 Hrs  T(C): 36.8 (2020 06:03), Max: 37.2 (2020 21:55)  T(F): 98.3 (2020 06:03), Max: 98.9 (2020 21:55)  HR: 77 (2020 06:03) (77 - 96)  BP: 154/72 (2020 06:03) (147/79 - 174/95)  BP(mean): --  RR: 16 (2020 06:03) (16 - 19)  SpO2: 94% (2020 06:03) (94% - 96%)    Physical Exam:  General: Well developed, well nourished, NAD  HEENT: NCAT, EOMI bl  Neurology: A&Ox3, nonfocal, sensation intact   Respiratory: CTA B/L, No W/R/R  CV: RRR, +S1/S2, no murmurs, rubs or gallops  Abdominal: Soft, tender to palpation, no palpable masses  Extremities: No C/C/E, + peripheral pulses  MSK: Normal ROM, no joint erythema or warmth, no joint swelling   Heme: No obvious ecchymosis or petechiae   Skin: warm, dry, normal color      ECG: NSR    I&O's Detail      LABS:                        12.8   24.84 )-----------( 228      ( 2020 05:01 )             38.1     11-    138  |  103  |  18  ----------------------------<  143<H>  4.3   |  29  |  1.40<H>    Ca    8.7      2020 05:01    TPro  6.8  /  Alb  3.2<L>  /  TBili  1.7<H>  /  DBili  x   /  AST  14<L>  /  ALT  17  /  AlkPhos  56  11-20        PT/INR - ( 2020 20:15 )   PT: 14.9 sec;   INR: 1.29 ratio         PTT - ( 2020 20:15 )  PTT:32.8 sec  Urinalysis Basic - ( 2020 20:38 )    Color: Yellow / Appearance: Clear / S.015 / pH: x  Gluc: x / Ketone: Negative  / Bili: Negative / Urobili: Negative   Blood: x / Protein: 100 / Nitrite: Negative   Leuk Esterase: Negative / RBC: 6-10 /HPF / WBC 0-2   Sq Epi: x / Non Sq Epi: Occasional / Bacteria: Occasional      I&O's Summary    BNP  RADIOLOGY & ADDITIONAL STUDIES:  Cardiac cath lab results 2018:  VENTRICLES: No left ventriculogram was performed.  CORONARY VESSELS: The coronary circulation is right dominant.  LM:   --  LM: Normal.  LAD:   --  LAD: Angiography showed minor luminal irregularities with no  flow limiting lesions.  CX:   --  Circumflex: Angiography showed minor luminal irregularities with  no flow limiting lesions.  --  OM1: Angiography showed minor luminal irregularities with no flow  limiting lesions.  RCA:   --  Mid RCA: There was a diffuse 30 % stenosis.  COMPLICATIONS: There were no complications.  DIAGNOSTIC IMPRESSIONS: Mild CAD.  DIAGNOSTIC RECOMMENDATIONS: Medical therapy.   Patient is a 70y old  Male who presents with a chief complaint of acute cholecystitis (2020 08:29)    HPI:  69 y/o M with PMHx of HTN, overactive bladder, MVP, peripheral neuropathy, hx of bilateral detached retinas, presents with abdominal pain x 2 days. Pain located in RUQ and described as constant ache with intermittent sharp pains, 7/10 in severity at its worst, nonradiating. Pain is currently 3/10 after pain medication given in ER. Patient states he overate on Tuesday night and vomited afterwards. He took Pepto-Bismol, which he reports improved his symptoms. On Wednesday, patient ate a very bland diet, but pain returned at 8:30pm. Last BM was yesterday and normal - no flatus or BM today. Patient reports feeling bloated and like he has "gas and gas pains." Denies fever, chills, chest pain, palpitations, shortness of breath, current nausea, urinary complaints.         PAST MEDICAL & SURGICAL HISTORY:  Overactive bladder    SCC (squamous cell carcinoma)    Peripheral neuropathy    HTN (hypertension)    Fibroid  tongue /removed    Fracture of left great toe    Status post closed reduction with internal fixation  right leg    Absence of tonsil    Detached retina, bilateral    History of Mohs micrographic surgery for squamous cell carcinoma in situ (SCCIS) of skin              ECHO  FINDINGS:  2018:  Mild concentric left ventricular hypertrophy. Normal global systolic function. LVEF is 60-65% by visual estimation. There are no significant regional wall motion abnormalities. Grade 1 diastolic dysfunction.    MEDICATIONS  (STANDING):  amLODIPine   Tablet 5 milliGRAM(s) Oral daily  influenza   Vaccine 0.5 milliLiter(s) IntraMuscular once  latanoprost 0.005% Ophthalmic Solution 1 Drop(s) Both EYES at bedtime  losartan 100 milliGRAM(s) Oral daily  oxybutynin 10 milliGRAM(s) Oral daily  pantoprazole  Injectable 40 milliGRAM(s) IV Push daily  piperacillin/tazobactam IVPB.. 3.375 Gram(s) IV Intermittent every 8 hours  sodium chloride 0.9%. 1000 milliLiter(s) (100 mL/Hr) IV Continuous <Continuous>    MEDICATIONS  (PRN):  acetaminophen   Tablet .. 1000 milliGRAM(s) Oral every 6 hours PRN Mild Pain (1 - 3)  HYDROmorphone  Injectable 1 milliGRAM(s) IV Push every 4 hours PRN Severe Pain (7 - 10)  HYDROmorphone  Injectable 0.5 milliGRAM(s) IV Push every 4 hours PRN Moderate Pain (4 - 6)  ondansetron Injectable 4 milliGRAM(s) IV Push every 6 hours PRN Nausea and/or Vomiting      FAMILY HISTORY:  Family history of hypertension in mother (Mother)    Family history of lung cancer (Father)          ROS negative except as noted above      SOCIAL HISTORY:    never smoker, etoh socially, denies other drug use    Vital Signs Last 24 Hrs  T(C): 36.8 (2020 06:03), Max: 37.2 (2020 21:55)  T(F): 98.3 (2020 06:03), Max: 98.9 (2020 21:55)  HR: 77 (2020 06:03) (77 - 96)  BP: 154/72 (2020 06:03) (147/79 - 174/95)  BP(mean): --  RR: 16 (2020 06:03) (16 - 19)  SpO2: 94% (2020 06:03) (94% - 96%)    Physical Exam:  General: Well developed, well nourished, NAD  HEENT: NCAT, EOMI bl  Neurology: A&Ox3, nonfocal, sensation intact   Respiratory: CTA B/L, No W/R/R  CV: RRR, +S1/S2, no murmurs, rubs or gallops  Abdominal: Soft, tender to palpation, no palpable masses  Extremities: No C/C/E, + peripheral pulses  MSK: Normal ROM, no joint erythema or warmth, no joint swelling   Heme: No obvious ecchymosis or petechiae   Skin: warm, dry, normal color      ECG: NSR    I&O's Detail      LABS:                        12.8   24.84 )-----------( 228      ( 2020 05:01 )             38.1     11-    138  |  103  |  18  ----------------------------<  143<H>  4.3   |  29  |  1.40<H>    Ca    8.7      2020 05:01    TPro  6.8  /  Alb  3.2<L>  /  TBili  1.7<H>  /  DBili  x   /  AST  14<L>  /  ALT  17  /  AlkPhos  56  11-20        PT/INR - ( 2020 20:15 )   PT: 14.9 sec;   INR: 1.29 ratio         PTT - ( 2020 20:15 )  PTT:32.8 sec  Urinalysis Basic - ( 2020 20:38 )    Color: Yellow / Appearance: Clear / S.015 / pH: x  Gluc: x / Ketone: Negative  / Bili: Negative / Urobili: Negative   Blood: x / Protein: 100 / Nitrite: Negative   Leuk Esterase: Negative / RBC: 6-10 /HPF / WBC 0-2   Sq Epi: x / Non Sq Epi: Occasional / Bacteria: Occasional      I&O's Summary    BNP  RADIOLOGY & ADDITIONAL STUDIES:  Cardiac cath lab results 2018:  VENTRICLES: No left ventriculogram was performed.  CORONARY VESSELS: The coronary circulation is right dominant.  LM:   --  LM: Normal.  LAD:   --  LAD: Angiography showed minor luminal irregularities with no  flow limiting lesions.  CX:   --  Circumflex: Angiography showed minor luminal irregularities with  no flow limiting lesions.  --  OM1: Angiography showed minor luminal irregularities with no flow  limiting lesions.  RCA:   --  Mid RCA: There was a diffuse 30 % stenosis.  COMPLICATIONS: There were no complications.  DIAGNOSTIC IMPRESSIONS: Mild CAD.  DIAGNOSTIC RECOMMENDATIONS: Medical therapy.

## 2020-11-20 NOTE — PROGRESS NOTE ADULT - SUBJECTIVE AND OBJECTIVE BOX
Post Operative Note  Patient: GOLDBERGER, MARTIN 70y (1950) Male   MRN: [id number]  Location: [address]  Visit: 11-19-20 Inpatient  Date: 11-20-20 @ 17:45    Procedure: s/p lap partial cholecystectomy,     Subjective:   69 y/o M seen and examined at bedside. Pt offers no complaints at this time in NAD. PT currently NPO with RODRIGUEZ drain in place, gomez in place. PT denies any fevers, chills, chest pain, shortness of breath,  nausea, vomiting or diarrhea.    Objective:  Vitals: T(F): 98.1 (11-20-20 @ 16:00), Max: 99.9 (11-20-20 @ 10:13)  HR: 72 (11-20-20 @ 16:00)  BP: 122/57 (11-20-20 @ 16:00) (121/72 - 174/95)  RR: 12 (11-20-20 @ 16:00)  SpO2: 96% (11-20-20 @ 16:00)    In:   11-20-20 @ 07:01  -  11-20-20 @ 17:45  --------------------------------------------------------  IN: 100 mL      IV Fluids: lactated ringers. 1000 milliLiter(s) (75 mL/Hr) IV Continuous <Continuous>  lactated ringers. 1000 milliLiter(s) (125 mL/Hr) IV Continuous <Continuous>    PHYSICAL EXAM:  GENERAL: NAD, well-developed  HEAD:  Atraumatic, Normocephalic  EYES: EOMI, PERRLA, conjunctiva and sclera clear  CHEST/LUNG: CTABL, no ronchi, rales or wheezing  HEART: RRR, no MRGs  ABDOMEN: Soft, protuberant, nondistended, +bs, incisional sites c/d/i, RODRIGUEZ drain in place with nonbilious, serosanguinous output, gomez in place, mild incisional ttp,   EXTREMITIES:  2+ Peripheral Pulses, No clubbing, cyanosis, or edema  PSYCH: AAOx3  NEUROLOGY: non-focal  SKIN: No rashes or lesions    Medications: [Standing]  acetaminophen   Tablet .. 650 milliGRAM(s) Oral every 6 hours PRN  HYDROmorphone  Injectable 1 milliGRAM(s) IV Push every 3 hours PRN  HYDROmorphone  Injectable 0.5 milliGRAM(s) IV Push every 10 minutes PRN  influenza   Vaccine 0.5 milliLiter(s) IntraMuscular once  lactated ringers. 1000 milliLiter(s) IV Continuous <Continuous>  lactated ringers. 1000 milliLiter(s) IV Continuous <Continuous>  ondansetron Injectable 4 milliGRAM(s) IV Push once PRN  oxyCODONE    IR 5 milliGRAM(s) Oral once PRN  oxyCODONE    IR 5 milliGRAM(s) Oral every 4 hours PRN  pantoprazole  Injectable 40 milliGRAM(s) IV Push daily  piperacillin/tazobactam IVPB.. 3.375 Gram(s) IV Intermittent every 8 hours  senna 2 Tablet(s) Oral at bedtime    Medications: [PRN]  acetaminophen   Tablet .. 650 milliGRAM(s) Oral every 6 hours PRN  HYDROmorphone  Injectable 1 milliGRAM(s) IV Push every 3 hours PRN  HYDROmorphone  Injectable 0.5 milliGRAM(s) IV Push every 10 minutes PRN  influenza   Vaccine 0.5 milliLiter(s) IntraMuscular once  lactated ringers. 1000 milliLiter(s) IV Continuous <Continuous>  lactated ringers. 1000 milliLiter(s) IV Continuous <Continuous>  ondansetron Injectable 4 milliGRAM(s) IV Push once PRN  oxyCODONE    IR 5 milliGRAM(s) Oral once PRN  oxyCODONE    IR 5 milliGRAM(s) Oral every 4 hours PRN  pantoprazole  Injectable 40 milliGRAM(s) IV Push daily  piperacillin/tazobactam IVPB.. 3.375 Gram(s) IV Intermittent every 8 hours  senna 2 Tablet(s) Oral at bedtime    Labs:                        12.8   24.84 )-----------( 228      ( 20 Nov 2020 05:01 )             38.1     11-20    138  |  103  |  18  ----------------------------<  143<H>  4.3   |  29  |  1.40<H>    Ca    8.7      20 Nov 2020 05:01    TPro  6.8  /  Alb  3.2<L>  /  TBili  1.7<H>  /  DBili  x   /  AST  14<L>  /  ALT  17  /  AlkPhos  56  11-20    PT/INR - ( 19 Nov 2020 20:15 )   PT: 14.9 sec;   INR: 1.29 ratio         PTT - ( 19 Nov 2020 20:15 )  PTT:32.8 sec      Assessment:  69 y/o M s/p partial cholecystectomy, with RODRIGUEZ and gomez in place, currently NPO,     Plan:  - cont NPO, IVF, postop IV abx  - cont gomez, likely d/c POD # 1  - OOB as tolerated  - serial exams  - pain control, supportive care  - zofran prn for nausea  - labs in am      Surgical Team Contact Information  Spectralink: Ext: [phone] or [phone]  Pager: [phone]    Date/Time: 11-20-20 @ 17:45   Post Operative Note  Patient: GOLDBERGER, MARTIN 70y (1950) Male   MRN: [id number]  Location: [address]  Visit: 11-19-20 Inpatient  Date: 11-20-20 @ 17:45    Procedure: s/p lap partial cholecystectomy,     Subjective:   69 y/o M seen and examined at bedside. Pt offers no complaints at this time in NAD. PT currently NPO with RODRIGUEZ drain in place, gomez in place. PT denies any fevers, chills, chest pain, shortness of breath,  nausea, vomiting or diarrhea.    Objective:  Vitals: T(F): 98.1 (11-20-20 @ 16:00), Max: 99.9 (11-20-20 @ 10:13)  HR: 72 (11-20-20 @ 16:00)  BP: 122/57 (11-20-20 @ 16:00) (121/72 - 174/95)  RR: 12 (11-20-20 @ 16:00)  SpO2: 96% (11-20-20 @ 16:00)    In:   11-20-20 @ 07:01  -  11-20-20 @ 17:45  --------------------------------------------------------  IN: 100 mL      IV Fluids: lactated ringers. 1000 milliLiter(s) (75 mL/Hr) IV Continuous <Continuous>  lactated ringers. 1000 milliLiter(s) (125 mL/Hr) IV Continuous <Continuous>    PHYSICAL EXAM:  GENERAL: NAD, well-developed  HEAD:  Atraumatic, Normocephalic  EYES: EOMI, PERRLA, conjunctiva and sclera clear  CHEST/LUNG: CTABL, no ronchi, rales or wheezing  HEART: RRR, no MRGs  ABDOMEN: Soft, protuberant, nondistended, +bs, incisional sites c/d/i, RODRIGUEZ drain in place with nonbilious, serosanguinous output, gomez in place, mild incisional ttp,   EXTREMITIES:  2+ Peripheral Pulses, No clubbing, cyanosis, or edema  PSYCH: AAOx3  NEUROLOGY: non-focal  SKIN: No rashes or lesions    Medications: [Standing]  acetaminophen   Tablet .. 650 milliGRAM(s) Oral every 6 hours PRN  HYDROmorphone  Injectable 1 milliGRAM(s) IV Push every 3 hours PRN  HYDROmorphone  Injectable 0.5 milliGRAM(s) IV Push every 10 minutes PRN  influenza   Vaccine 0.5 milliLiter(s) IntraMuscular once  lactated ringers. 1000 milliLiter(s) IV Continuous <Continuous>  lactated ringers. 1000 milliLiter(s) IV Continuous <Continuous>  ondansetron Injectable 4 milliGRAM(s) IV Push once PRN  oxyCODONE    IR 5 milliGRAM(s) Oral once PRN  oxyCODONE    IR 5 milliGRAM(s) Oral every 4 hours PRN  pantoprazole  Injectable 40 milliGRAM(s) IV Push daily  piperacillin/tazobactam IVPB.. 3.375 Gram(s) IV Intermittent every 8 hours  senna 2 Tablet(s) Oral at bedtime    Medications: [PRN]  acetaminophen   Tablet .. 650 milliGRAM(s) Oral every 6 hours PRN  HYDROmorphone  Injectable 1 milliGRAM(s) IV Push every 3 hours PRN  HYDROmorphone  Injectable 0.5 milliGRAM(s) IV Push every 10 minutes PRN  influenza   Vaccine 0.5 milliLiter(s) IntraMuscular once  lactated ringers. 1000 milliLiter(s) IV Continuous <Continuous>  lactated ringers. 1000 milliLiter(s) IV Continuous <Continuous>  ondansetron Injectable 4 milliGRAM(s) IV Push once PRN  oxyCODONE    IR 5 milliGRAM(s) Oral once PRN  oxyCODONE    IR 5 milliGRAM(s) Oral every 4 hours PRN  pantoprazole  Injectable 40 milliGRAM(s) IV Push daily  piperacillin/tazobactam IVPB.. 3.375 Gram(s) IV Intermittent every 8 hours  senna 2 Tablet(s) Oral at bedtime    Labs:                        12.8   24.84 )-----------( 228      ( 20 Nov 2020 05:01 )             38.1     11-20    138  |  103  |  18  ----------------------------<  143<H>  4.3   |  29  |  1.40<H>    Ca    8.7      20 Nov 2020 05:01    TPro  6.8  /  Alb  3.2<L>  /  TBili  1.7<H>  /  DBili  x   /  AST  14<L>  /  ALT  17  /  AlkPhos  56  11-20    PT/INR - ( 19 Nov 2020 20:15 )   PT: 14.9 sec;   INR: 1.29 ratio         PTT - ( 19 Nov 2020 20:15 )  PTT:32.8 sec      Assessment:  69 y/o M s/p partial cholecystectomy, with RODRIGUEZ and gomez in place, currently NPO,     Plan:  - cont NPO, IVF, postop IV abx  - monitor RODRIGUEZ outputs,   - cont gomez, likely d/c POD # 1  - OOB as tolerated  - serial exams  - pain control, supportive care  - zofran prn for nausea  - labs in am      Surgical Team Contact Information  Spectralink: Ext: [phone] or [phone]  Pager: [phone]    Date/Time: 11-20-20 @ 17:45

## 2020-11-20 NOTE — PROGRESS NOTE ADULT - SUBJECTIVE AND OBJECTIVE BOX
Patient is a 70y old  Male who presents with a chief complaint of acute cholecystitis (2020 08:35)      INTERVAL HPI: Pt seen and examined. States he is feeling ok still has some pain in RUQ, has urge to use restroom.     OVERNIGHT EVENTS: none noted  T(F): 99.9 (20 @ 10:52), Max: 99.9 (20 @ 10:13)  HR: 96 (20 @ 10:52) (77 - 96)  BP: 126/57 (20 @ 10:52) (121/72 - 174/95)  RR: 19 (20 @ 10:52) (16 - 19)  SpO2: 94% (20 @ 10:52) (93% - 96%)  I&O's Summary      REVIEW OF SYSTEMS:  CONSTITUTIONAL: No fever, weight loss, or fatigue  RESPIRATORY: No cough, wheezing, chills or hemoptysis; No shortness of breath  CARDIOVASCULAR: No chest pain, palpitations, dizziness, or leg swelling  GASTROINTESTINAL: +abd pain RUQ  GENITOURINARY: No dysuria, frequency, hematuria, or incontinence  NEUROLOGICAL: No headaches, memory loss, loss of strength, numbness, or tremors  SKIN: No itching, burning, rashes, or lesions   MUSCULOSKELETAL: No joint pain or swelling; No muscle, back, or extremity pain  PSYCHIATRIC: No depression, anxiety, mood swings, or difficulty sleeping      Physical Exam:     General: NAD, obese  Head: Atraumatic, normocephalic  Neck: Supple  Heart: RRR. +S1/S2.   Lungs: CTA B/L. Good inspiratory effort. No wheeze, rhonchi, rales.   Abdomen: Soft, protuberant. tender to palpation in RUQ. No rebound tenderness or guarding. +Haider's sign. +BS.  Ext: No calf tenderness b/l.  Neuro: A&Ox3, nonfocal, good strength b/l      LABS:                        12.8   24.84 )-----------( 228      ( 2020 05:01 )             38.1         138  |  103  |  18  ----------------------------<  143<H>  4.3   |  29  |  1.40<H>    Ca    8.7      2020 05:01    TPro  6.8  /  Alb  3.2<L>  /  TBili  1.7<H>  /  DBili  x   /  AST  14<L>  /  ALT  17  /  AlkPhos  56  11-20    PT/INR - ( 2020 20:15 )   PT: 14.9 sec;   INR: 1.29 ratio         PTT - ( 2020 20:15 )  PTT:32.8 sec  Urinalysis Basic - ( 2020 20:38 )    Color: Yellow / Appearance: Clear / S.015 / pH: x  Gluc: x / Ketone: Negative  / Bili: Negative / Urobili: Negative   Blood: x / Protein: 100 / Nitrite: Negative   Leuk Esterase: Negative / RBC: 6-10 /HPF / WBC 0-2   Sq Epi: x / Non Sq Epi: Occasional / Bacteria: Occasional      CAPILLARY BLOOD GLUCOSE                  MEDICATIONS  (STANDING):  amLODIPine   Tablet 5 milliGRAM(s) Oral daily  influenza   Vaccine 0.5 milliLiter(s) IntraMuscular once  latanoprost 0.005% Ophthalmic Solution 1 Drop(s) Both EYES at bedtime  losartan 100 milliGRAM(s) Oral daily  oxybutynin 10 milliGRAM(s) Oral daily  pantoprazole  Injectable 40 milliGRAM(s) IV Push daily  piperacillin/tazobactam IVPB.. 3.375 Gram(s) IV Intermittent every 8 hours  sodium chloride 0.9%. 1000 milliLiter(s) (100 mL/Hr) IV Continuous <Continuous>    MEDICATIONS  (PRN):  acetaminophen   Tablet .. 1000 milliGRAM(s) Oral every 6 hours PRN Mild Pain (1 - 3)  HYDROmorphone  Injectable 1 milliGRAM(s) IV Push every 4 hours PRN Severe Pain (7 - 10)  HYDROmorphone  Injectable 0.5 milliGRAM(s) IV Push every 4 hours PRN Moderate Pain (4 - 6)  ondansetron Injectable 4 milliGRAM(s) IV Push every 6 hours PRN Nausea and/or Vomiting

## 2020-11-20 NOTE — PROGRESS NOTE ADULT - SUBJECTIVE AND OBJECTIVE BOX
HOD # 1    SUBJECTIVE:  71 y/o M seen and examined at bedside. Pt c/o chills however afebrile. PT c/o continued RUQ discomfort however in NAD. PT currently NPO. PT denies any fevers, chills, chest pain, shortness of breath, abdominal pain, nausea, vomiting or diarrhea.    Vital Signs Last 24 Hrs  T(C): 36.8 (2020 06:03), Max: 37.2 (2020 21:55)  T(F): 98.3 (2020 06:03), Max: 98.9 (2020 21:55)  HR: 77 (2020 06:03) (77 - 96)  BP: 154/72 (2020 06:03) (147/79 - 174/95)  BP(mean): --  RR: 16 (2020 06:03) (16 - 19)  SpO2: 94% (2020 06:03) (94% - 96%)    PHYSICAL EXAM:  GENERAL: No acute distress, well-developed  HEAD:  Atraumatic, Normocephalic  CHEST/LUNG: CTABL, no ronchi, rales or wheezing  HEART: RRR, no MRGs  ABDOMEN: Soft, RUQ ttp, nondistended, +bs  NEUROLOGY: A&O x 3, no focal deficits    I&O's Summary    I&O's Detail    MEDICATIONS  (STANDING):  amLODIPine   Tablet 5 milliGRAM(s) Oral daily  influenza   Vaccine 0.5 milliLiter(s) IntraMuscular once  latanoprost 0.005% Ophthalmic Solution 1 Drop(s) Both EYES at bedtime  losartan 100 milliGRAM(s) Oral daily  oxybutynin 10 milliGRAM(s) Oral daily  pantoprazole  Injectable 40 milliGRAM(s) IV Push daily  piperacillin/tazobactam IVPB.. 3.375 Gram(s) IV Intermittent every 8 hours  sodium chloride 0.9%. 1000 milliLiter(s) (100 mL/Hr) IV Continuous <Continuous>    MEDICATIONS  (PRN):  acetaminophen   Tablet .. 1000 milliGRAM(s) Oral every 6 hours PRN Mild Pain (1 - 3)  HYDROmorphone  Injectable 1 milliGRAM(s) IV Push every 4 hours PRN Severe Pain (7 - 10)  HYDROmorphone  Injectable 0.5 milliGRAM(s) IV Push every 4 hours PRN Moderate Pain (4 - 6)  ondansetron Injectable 4 milliGRAM(s) IV Push every 6 hours PRN Nausea and/or Vomiting    LABS:                        12.8   24.84 )-----------( 228      ( 2020 05:01 )             38.1     11-20    138  |  103  |  18  ----------------------------<  143<H>  4.3   |  29  |  1.40<H>    Ca    8.7      2020 05:01    TPro  6.8  /  Alb  3.2<L>  /  TBili  1.7<H>  /  DBili  x   /  AST  14<L>  /  ALT  17  /  AlkPhos  56      PT/INR - ( 2020 20:15 )   PT: 14.9 sec;   INR: 1.29 ratio         PTT - ( 2020 20:15 )  PTT:32.8 sec  Urinalysis Basic - ( 2020 20:38 )    Color: Yellow / Appearance: Clear / S.015 / pH: x  Gluc: x / Ketone: Negative  / Bili: Negative / Urobili: Negative   Blood: x / Protein: 100 / Nitrite: Negative   Leuk Esterase: Negative / RBC: 6-10 /HPF / WBC 0-2   Sq Epi: x / Non Sq Epi: Occasional / Bacteria: Occasional    Imaging:    ASSESSMENT  71 y/o M HOD # 1 admitted for acute cholecystitis, currently NPO with WBC Of 24.84, creatinine inc to 1.40 likely to dehydration,     PLAN  - added on for OR today for lap/poss open cholecystectomy  - IVF rate increased to 100/hr  - IV abx  - pain control, supportive care  - OOB, ambulation as tolerated  - NPO, IVF   - serial abdominal exams  - labs in am  - discussed with Dr. Healy    Surgical Team Contact Information  Spectralink: Ext: [phone] or [phone]  Pager: [phone] HOD # 1      SUBJECTIVE:  71 y/o M seen and examined at bedside. Pt c/o chills however afebrile. PT c/o continued RUQ discomfort however in NAD. PT currently NPO. PT denies any fevers, chills, chest pain, shortness of breath, abdominal pain, nausea, vomiting or diarrhea.      Vital Signs Last 24 Hrs  T(F): 98.3 (2020 06:03), Max: 98.9 (2020 21:55)  HR: 77 (2020 06:03) (77 - 96)  BP: 154/72 (2020 06:03) (147/79 - 174/95)  RR: 16 (2020 06:03) (16 - 19)  SpO2: 94% (2020 06:03) (94% - 96%)      PHYSICAL EXAM:  GENERAL: No acute distress, well-developed  HEAD:  Atraumatic, Normocephalic  CHEST/LUNG: CTABL, no ronchi, rales or wheezing  HEART: RRR, no MRGs  ABDOMEN: Soft, RUQ ttp, nondistended, +bs  NEUROLOGY: A&O x 3, no focal deficits      MEDICATIONS  (STANDING):  amLODIPine   Tablet 5 milliGRAM(s) Oral daily  influenza   Vaccine 0.5 milliLiter(s) IntraMuscular once  latanoprost 0.005% Ophthalmic Solution 1 Drop(s) Both EYES at bedtime  losartan 100 milliGRAM(s) Oral daily  oxybutynin 10 milliGRAM(s) Oral daily  pantoprazole  Injectable 40 milliGRAM(s) IV Push daily  piperacillin/tazobactam IVPB.. 3.375 Gram(s) IV Intermittent every 8 hours  sodium chloride 0.9%. 1000 milliLiter(s) (100 mL/Hr) IV Continuous <Continuous>      MEDICATIONS  (PRN):  acetaminophen   Tablet .. 1000 milliGRAM(s) Oral every 6 hours PRN Mild Pain (1 - 3)  HYDROmorphone  Injectable 1 milliGRAM(s) IV Push every 4 hours PRN Severe Pain (7 - 10)  HYDROmorphone  Injectable 0.5 milliGRAM(s) IV Push every 4 hours PRN Moderate Pain (4 - 6)  ondansetron Injectable 4 milliGRAM(s) IV Push every 6 hours PRN Nausea and/or Vomiting      LABS:                        12.8   24.84 )-----------( 228      ( 2020 05:01 )             38.1     11-20    138  |  103  |  18  ----------------------------<  143<H>  4.3   |  29  |  1.40<H>    Ca    8.7      2020 05:01    TPro  6.8  /  Alb  3.2<L>  /  TBili  1.7<H>  /  DBili  x   /  AST  14<L>  /  ALT  17  /  AlkPhos  56  11-20    PT/INR - ( 2020 20:15 )   PT: 14.9 sec;   INR: 1.29 ratio    PTT - ( 2020 20:15 )  PTT:32.8 sec    Urinalysis Basic - ( 2020 20:38 )  Color: Yellow / Appearance: Clear / S.015 / pH: x  Gluc: x / Ketone: Negative  / Bili: Negative / Urobili: Negative   Blood: x / Protein: 100 / Nitrite: Negative   Leuk Esterase: Negative / RBC: 6-10 /HPF / WBC 0-2   Sq Epi: x / Non Sq Epi: Occasional / Bacteria: Occasional    Imaging: No new imaging today, Sono and CT of admission reviewed personally (report and images) and discussed with patient and spouse in detail      ASSESSMENT  71 y/o M HOD # 1 admitted for acute cholecystitis, currently NPO with WBC Of 24.84, creatinine inc to 1.40 likely to dehydration,     P  BELLA  - added on for OR today for lap/poss open cholecystectomy  - IVF rate increased to 100/hr  - IV abx  - pain control, supportive care  - OOB, ambulation as tolerated  - NPO, IVF   - serial abdominal exams  - labs in am  - discussed with Dr. Healy      Surgical Team Contact Information  Spectralink: Ext: [phone] or [phone]  Pager: [phone]

## 2020-11-20 NOTE — PROGRESS NOTE ADULT - PROBLEM SELECTOR PLAN 9
CKD stage 3 a  monitor CMP. [place] on CKD stage 3 a  avooid nephrotoxins  trend renal function  ivf as indicated, monitor i/os

## 2020-11-20 NOTE — CONSULT NOTE ADULT - ATTENDING COMMENTS
I saw and examined the patient personally. Spoke with above provider regarding this case. I reviewed the above findings completely.  I agree with the above history, physical, and plan which I have edited where appropriate.     Currently no active cardiac conditions. No signs of ischemia, ADHF, clinical exam not consistent with severe stenotic valvular disease, no unstable arrhythmias noted. Therefore able to proceed with this intermediate risk urgent GI surgery without any further cardiac workup. Routine hemodynamic monitoring is suggested during the procedure.

## 2020-11-20 NOTE — CONSULT NOTE ADULT - ASSESSMENT
71 y/o M with PMHx of HTN, overactive bladder, MVP, peripheral neuropathy, hx of bilateral detached retinas, presents with abdominal pain x 2 days. Consulted for presurgical optimization 71 y/o M with PMHx of HTN, overactive bladder, MVP, peripheral neuropathy, hx of bilateral detached retinas, presents with abdominal pain x 2 days. Consulted for presurgical optimization     #Pre-surgical cardiac optimization   - Patient is not complaining of any anginal symptoms at this time.  - Pt does not exercise regularly, but says he does not experience chest pain while climbing stairs or walking, admits to occasional shortness of breath   - No clear evidence of acute ischemia, EKG NSR  - No meaningful evidence of volume overload.  - Cardiac cath in 2018 showed no flow-limiting lesions, mild CAD  - BP well controlled, monitor routine hemodynamics.  - Monitor and replete lytes, keep K>4, Mg>2.  - Pt has no active ischemia, decompensated heart failure, unstable arrythmia, or severe stenotic valvular disease, and has intermediate cardiac risk factors. He is optimized from cardiovascular standpoint to proceed with planned procedure with routine hemodynamic monitoring.   - Other cardiovascular workup will depend on clinical course.  - All other workup per primary team.  - Will continue to follow.             69 y/o M with PMHx of HTN, overactive bladder, MVP, peripheral neuropathy, hx of bilateral detached retinas, presents with abdominal pain x 2 days. Consulted for presurgical optimization     #Pre-surgical cardiac optimization   - Patient is not complaining of any anginal symptoms at this time.  - Pt does not exercise regularly, but says he does not experience chest pain while climbing stairs or walking, admits to feeling winded after climbing stairs  - Stress test in 2018 METS 5  - No clear evidence of acute ischemia, EKG NSR  - No meaningful evidence of volume overload.  - Cardiac cath in 2018 showed no flow-limiting lesions, non obstructive CAD  - BP well controlled, monitor routine hemodynamics.  - Monitor and replete lytes, keep K>4, Mg>2.  - Pt has no active ischemia, decompensated heart failure, unstable arrythmia, or severe stenotic valvular disease, and has intermediate cardiac risk factors. He is optimized from cardiovascular standpoint to proceed with planned procedure with routine hemodynamic monitoring.   - Other cardiovascular workup will depend on clinical course.  - All other workup per primary team.  - Will continue to follow.             71 y/o M with PMHx of HTN, overactive bladder, MVP, peripheral neuropathy, hx of bilateral detached retinas, presents with abdominal pain x 2 days. Consulted for presurgical optimization     #Pre-surgical cardiac optimization   - Patient is not complaining of any anginal symptoms at this time.  - Pt does not exercise regularly, but says he does not experience chest pain while climbing stairs or walking, admits to feeling winded after climbing stairs  - Stress test in 2018 METS 5. Cardiac cath in 2018 showed no flow-limiting lesions, non obstructive CAD  - No clear evidence of acute ischemia, EKG NSR  - No meaningful evidence of volume overload.  - BP well controlled, monitor routine hemodynamics.  - Monitor and replete lytes, keep K>4, Mg>2.  - Pt has no active ischemia, decompensated heart failure, unstable arrythmia, or severe stenotic valvular disease, and has intermediate cardiac risk factors. He is optimized from cardiovascular standpoint to proceed with planned procedure with routine hemodynamic monitoring.   - Other cardiovascular workup will depend on clinical course.  - All other workup per primary team.  - Will continue to follow.

## 2020-11-20 NOTE — PROGRESS NOTE ADULT - PROBLEM SELECTOR PLAN 3
chronic, stable  cont home meds  continue home losartan 100 mg QD, amlodipine 5 mg QD with hold parameters., adjust as clincially appropiate, pain control first

## 2020-11-21 LAB
ALBUMIN SERPL ELPH-MCNC: 2.5 G/DL — LOW (ref 3.3–5)
ALP SERPL-CCNC: 50 U/L — SIGNIFICANT CHANGE UP (ref 40–120)
ALT FLD-CCNC: 24 U/L — SIGNIFICANT CHANGE UP (ref 12–78)
ANION GAP SERPL CALC-SCNC: 5 MMOL/L — SIGNIFICANT CHANGE UP (ref 5–17)
AST SERPL-CCNC: 16 U/L — SIGNIFICANT CHANGE UP (ref 15–37)
BASOPHILS # BLD AUTO: 0.01 K/UL — SIGNIFICANT CHANGE UP (ref 0–0.2)
BASOPHILS NFR BLD AUTO: 0.1 % — SIGNIFICANT CHANGE UP (ref 0–2)
BILIRUB SERPL-MCNC: 0.8 MG/DL — SIGNIFICANT CHANGE UP (ref 0.2–1.2)
BUN SERPL-MCNC: 22 MG/DL — SIGNIFICANT CHANGE UP (ref 7–23)
CALCIUM SERPL-MCNC: 8.1 MG/DL — LOW (ref 8.5–10.1)
CHLORIDE SERPL-SCNC: 106 MMOL/L — SIGNIFICANT CHANGE UP (ref 96–108)
CO2 SERPL-SCNC: 29 MMOL/L — SIGNIFICANT CHANGE UP (ref 22–31)
CREAT SERPL-MCNC: 1.3 MG/DL — SIGNIFICANT CHANGE UP (ref 0.5–1.3)
EOSINOPHIL # BLD AUTO: 0 K/UL — SIGNIFICANT CHANGE UP (ref 0–0.5)
EOSINOPHIL NFR BLD AUTO: 0 % — SIGNIFICANT CHANGE UP (ref 0–6)
GLUCOSE SERPL-MCNC: 122 MG/DL — HIGH (ref 70–99)
HCT VFR BLD CALC: 32.8 % — LOW (ref 39–50)
HGB BLD-MCNC: 11.3 G/DL — LOW (ref 13–17)
IMM GRANULOCYTES NFR BLD AUTO: 1.1 % — SIGNIFICANT CHANGE UP (ref 0–1.5)
LYMPHOCYTES # BLD AUTO: 0.88 K/UL — LOW (ref 1–3.3)
LYMPHOCYTES # BLD AUTO: 5.2 % — LOW (ref 13–44)
MAGNESIUM SERPL-MCNC: 2 MG/DL — SIGNIFICANT CHANGE UP (ref 1.6–2.6)
MCHC RBC-ENTMCNC: 29.3 PG — SIGNIFICANT CHANGE UP (ref 27–34)
MCHC RBC-ENTMCNC: 34.5 GM/DL — SIGNIFICANT CHANGE UP (ref 32–36)
MCV RBC AUTO: 85 FL — SIGNIFICANT CHANGE UP (ref 80–100)
MONOCYTES # BLD AUTO: 1.06 K/UL — HIGH (ref 0–0.9)
MONOCYTES NFR BLD AUTO: 6.3 % — SIGNIFICANT CHANGE UP (ref 2–14)
NEUTROPHILS # BLD AUTO: 14.79 K/UL — HIGH (ref 1.8–7.4)
NEUTROPHILS NFR BLD AUTO: 87.3 % — HIGH (ref 43–77)
NRBC # BLD: 0 /100 WBCS — SIGNIFICANT CHANGE UP (ref 0–0)
PHOSPHATE SERPL-MCNC: 3.3 MG/DL — SIGNIFICANT CHANGE UP (ref 2.5–4.5)
PLATELET # BLD AUTO: 212 K/UL — SIGNIFICANT CHANGE UP (ref 150–400)
POTASSIUM SERPL-MCNC: 3.7 MMOL/L — SIGNIFICANT CHANGE UP (ref 3.5–5.3)
POTASSIUM SERPL-SCNC: 3.7 MMOL/L — SIGNIFICANT CHANGE UP (ref 3.5–5.3)
PROCALCITONIN SERPL-MCNC: 2.51 NG/ML — HIGH (ref 0–0.04)
PROT SERPL-MCNC: 6.1 G/DL — SIGNIFICANT CHANGE UP (ref 6–8.3)
RBC # BLD: 3.86 M/UL — LOW (ref 4.2–5.8)
RBC # FLD: 13.2 % — SIGNIFICANT CHANGE UP (ref 10.3–14.5)
SODIUM SERPL-SCNC: 140 MMOL/L — SIGNIFICANT CHANGE UP (ref 135–145)
WBC # BLD: 16.92 K/UL — HIGH (ref 3.8–10.5)
WBC # FLD AUTO: 16.92 K/UL — HIGH (ref 3.8–10.5)

## 2020-11-21 PROCEDURE — 99233 SBSQ HOSP IP/OBS HIGH 50: CPT

## 2020-11-21 RX ORDER — LORATADINE 10 MG/1
10 TABLET ORAL DAILY
Refills: 0 | Status: DISCONTINUED | OUTPATIENT
Start: 2020-11-21 | End: 2020-11-23

## 2020-11-21 RX ORDER — LORATADINE 10 MG/1
10 TABLET ORAL ONCE
Refills: 0 | Status: COMPLETED | OUTPATIENT
Start: 2020-11-21 | End: 2020-11-21

## 2020-11-21 RX ADMIN — SODIUM CHLORIDE 125 MILLILITER(S): 9 INJECTION, SOLUTION INTRAVENOUS at 08:51

## 2020-11-21 RX ADMIN — LORATADINE 10 MILLIGRAM(S): 10 TABLET ORAL at 22:53

## 2020-11-21 RX ADMIN — ENOXAPARIN SODIUM 40 MILLIGRAM(S): 100 INJECTION SUBCUTANEOUS at 12:30

## 2020-11-21 RX ADMIN — PIPERACILLIN AND TAZOBACTAM 25 GRAM(S): 4; .5 INJECTION, POWDER, LYOPHILIZED, FOR SOLUTION INTRAVENOUS at 04:39

## 2020-11-21 RX ADMIN — PANTOPRAZOLE SODIUM 40 MILLIGRAM(S): 20 TABLET, DELAYED RELEASE ORAL at 12:30

## 2020-11-21 RX ADMIN — LORATADINE 10 MILLIGRAM(S): 10 TABLET ORAL at 05:41

## 2020-11-21 RX ADMIN — OXYCODONE HYDROCHLORIDE 5 MILLIGRAM(S): 5 TABLET ORAL at 04:38

## 2020-11-21 RX ADMIN — PIPERACILLIN AND TAZOBACTAM 25 GRAM(S): 4; .5 INJECTION, POWDER, LYOPHILIZED, FOR SOLUTION INTRAVENOUS at 15:57

## 2020-11-21 RX ADMIN — OXYCODONE HYDROCHLORIDE 5 MILLIGRAM(S): 5 TABLET ORAL at 05:30

## 2020-11-21 RX ADMIN — SENNA PLUS 2 TABLET(S): 8.6 TABLET ORAL at 21:12

## 2020-11-21 RX ADMIN — PIPERACILLIN AND TAZOBACTAM 25 GRAM(S): 4; .5 INJECTION, POWDER, LYOPHILIZED, FOR SOLUTION INTRAVENOUS at 21:12

## 2020-11-21 NOTE — PROGRESS NOTE ADULT - SUBJECTIVE AND OBJECTIVE BOX
[facility] -- Kylie Owusu, Shana Silvestre, Darryl Castillo Savella, Goodger: Office # [phone]    Follow Up:  cardiac optimization pre/post procedure     Subjective/Observations: Patient seen and examined. Patient awake, alert, resting in bed. No complaints of chest pain, dyspnea, palpitations or dizziness. No signs of orthopnea or PND. Slept poor. C/o slight abdominal discomfort.     REVIEW OF SYSTEMS: All review of systems is negative for eye, ENT, GI, , allergic, dermatologic, musculoskeletal and neurologic except as described above    PAST MEDICAL & SURGICAL HISTORY:  Overactive bladder  SCC (squamous cell carcinoma)  Peripheral neuropathy  HTN (hypertension)  Fibroid tongue /removed  Fracture of left great toe  Status post closed reduction with internal fixation right leg  Absence of tonsil  Detached retina, bilateral  History of Mohs micrographic surgery for squamous cell carcinoma in situ (SCCIS) of skin    MEDICATIONS  (STANDING):  enoxaparin Injectable 40 milliGRAM(s) SubCutaneous daily  influenza   Vaccine 0.5 milliLiter(s) IntraMuscular once  lactated ringers. 1000 milliLiter(s) (125 mL/Hr) IV Continuous <Continuous>  pantoprazole  Injectable 40 milliGRAM(s) IV Push daily  piperacillin/tazobactam IVPB.. 3.375 Gram(s) IV Intermittent every 8 hours  senna 2 Tablet(s) Oral at bedtime    MEDICATIONS  (PRN):  acetaminophen   Tablet .. 650 milliGRAM(s) Oral every 6 hours PRN Temp greater or equal to 38C (100.4F), Mild Pain (1 - 3)  HYDROmorphone  Injectable 1 milliGRAM(s) IV Push every 3 hours PRN Severe Pain (7 - 10)  oxyCODONE    IR 5 milliGRAM(s) Oral every 4 hours PRN Moderate Pain (4 - 6)    Allergies  No Known Drug Allergies  Seafood (Diarrhea)    Vital Signs Last 24 Hrs  T(C): 36.6 (21 Nov 2020 05:08), Max: 36.9 (20 Nov 2020 15:16)  T(F): 97.8 (21 Nov 2020 05:08), Max: 98.5 (20 Nov 2020 18:19)  HR: 75 (21 Nov 2020 05:08) (65 - 82)  BP: 133/73 (21 Nov 2020 05:08) (107/62 - 133/73)  BP(mean): --  RR: 18 (21 Nov 2020 05:08) (12 - 18)  SpO2: 92% (21 Nov 2020 05:08) (92% - 99%)  I&O's Summary    20 Nov 2020 07:01  -  21 Nov 2020 07:00  --------------------------------------------------------  IN: 100 mL / OUT: 1790 mL / NET: -1690 mL    21 Nov 2020 07:01  -  21 Nov 2020 10:54  --------------------------------------------------------  IN: 1000 mL / OUT: 500 mL / NET: 500 mL    TELE: Not on telemetry   PHYSICAL EXAM:  Appearance: NAD, no distress, alert,  HEENT: Moist Mucous Membranes, Anicteric  Cardiovascular: Regular rate and rhythm, Normal S1 S2, No JVD, No murmurs, No rubs, gallops or clicks  Respiratory: Non-labored, Clear to auscultation, No rales, No rhonchi, No wheezing.   Gastrointestinal:  Soft, diffusely tender, mild distention, + BS  Neurologic: Non-focal  Skin: Warm and dry, No visible rashes or ulcers, No ecchymosis, No cyanosis  Musculoskeletal: No clubbing, No cyanosis, No joint swelling/tenderness  Psychiatry: Mood & affect appropriate  Lymph: No peripheral edema.     LABS: All Labs Reviewed:                        11.3   16.92 )-----------( 212      ( 21 Nov 2020 06:35 )             32.8                         12.8   24.84 )-----------( 228      ( 20 Nov 2020 05:01 )             38.1                         13.8   25.97 )-----------( 292      ( 19 Nov 2020 18:34 )             39.2     21 Nov 2020 06:35    140    |  106    |  22     ----------------------------<  122    3.7     |  29     |  1.30   20 Nov 2020 05:01    138    |  103    |  18     ----------------------------<  143    4.3     |  29     |  1.40   19 Nov 2020 18:34    135    |  100    |  18     ----------------------------<  146    4.0     |  27     |  1.30     Ca    8.1        21 Nov 2020 06:35  Ca    8.7        20 Nov 2020 05:01  Ca    9.5        19 Nov 2020 18:34  Phos  3.3       21 Nov 2020 06:35  Mg     2.0       21 Nov 2020 06:35  TPro  6.1    /  Alb  2.5    /  TBili  0.8    /  DBili  x      /  AST  16     /  ALT  24     /  AlkPhos  50     21 Nov 2020 06:35  TPro  6.8    /  Alb  3.2    /  TBili  1.7    /  DBili  x      /  AST  14     /  ALT  17     /  AlkPhos  56     20 Nov 2020 05:01  TPro  7.6    /  Alb  3.7    /  TBili  1.8    /  DBili  x      /  AST  17     /  ALT  22     /  AlkPhos  61     19 Nov 2020 18:34  PT/INR - ( 19 Nov 2020 20:15 )   PT: 14.9 sec;   INR: 1.29 ratio    PTT - ( 19 Nov 2020 20:15 )  PTT:32.8 sec    < from: Xray Chest 1 View AP/PA (11.19.20 @ 19:32) >  AP chest.  Heart magnified by AP film shallow inspiration. Faint calcification aortic arch. Left costophrenic angle excluded. Grossly clear lungs.  Impression: Grossly clear lungs  < end of copied text >

## 2020-11-21 NOTE — PROGRESS NOTE ADULT - SUBJECTIVE AND OBJECTIVE BOX
Patient is a 70y old  Male who presents with a chief complaint of acute cholecystitis (2020 13:23)      INTERVAL HPI: Pt seen and examined. States he is feeling better, passing lots of gas and pain is less. Denies any other acute complaints at this time.     OVERNIGHT EVENTS: none noted  T(F): 99.2 (20 @ 18:06), Max: 99.2 (20 @ 18:06)  HR: 65 (20 @ 18:06) (65 - 77)  BP: 129/74 (20 @ 18:06) (107/62 - 144/76)  RR: 18 (20 @ 18:06) (18 - 18)  SpO2: 91% (20 @ 18:06) (91% - 94%)  I&O's Summary    2020 07:  -  2020 07:00  --------------------------------------------------------  IN: 100 mL / OUT: 1790 mL / NET: -1690 mL    2020 07:01  -  2020 20:02  --------------------------------------------------------  IN: 1700 mL / OUT: 720 mL / NET: 980 mL        REVIEW OF SYSTEMS:  CONSTITUTIONAL: No fever, weight loss, or fatigue  RESPIRATORY: No cough, wheezing, chills or hemoptysis; No shortness of breath  CARDIOVASCULAR: No chest pain, palpitations, dizziness, or leg swelling  GASTROINTESTINAL: No abdominal or epigastric pain. No nausea, vomiting, or hematemesis; No diarrhea or constipation. No melena or hematochezia.  GENITOURINARY: No dysuria, frequency, hematuria, or incontinence  NEUROLOGICAL: No headaches, memory loss, loss of strength, numbness, or tremors  SKIN: No itching, burning, rashes, or lesions   MUSCULOSKELETAL: No joint pain or swelling; No muscle, back, or extremity pain  PSYCHIATRIC: No depression, anxiety, mood swings, or difficulty sleeping      PHYSICAL EXAM:  GENERAL: NAD, well-groomed, obese  NERVOUS SYSTEM:  Alert & Oriented X3, Good concentration; Motor Strength 5/5 B/L upper and lower extremities; DTRs 2+ intact and symmetric  CHEST/LUNG: Clear to percussion bilaterally; No rales, rhonchi, wheezing, or rubs  HEART: Regular rate and rhythm; No murmurs, rubs, or gallops  ABDOMEN: Soft, Nontender, distended; Bowel sounds present, incisions appear c/d/i  EXTREMITIES:  2+ Peripheral Pulses, No clubbing, cyanosis, or edema  SKIN: No rashes or lesions    LABS:                        11.3   16.92 )-----------( 212      ( 2020 06:35 )             32.8     11-21    140  |  106  |  22  ----------------------------<  122<H>  3.7   |  29  |  1.30    Ca    8.1<L>      2020 06:35  Phos  3.3     -  Mg     2.0         TPro  6.1  /  Alb  2.5<L>  /  TBili  0.8  /  DBili  x   /  AST  16  /  ALT  24  /  AlkPhos  50  -21    PT/INR - ( 2020 20:15 )   PT: 14.9 sec;   INR: 1.29 ratio         PTT - ( 2020 20:15 )  PTT:32.8 sec  Urinalysis Basic - ( 2020 20:38 )    Color: Yellow / Appearance: Clear / S.015 / pH: x  Gluc: x / Ketone: Negative  / Bili: Negative / Urobili: Negative   Blood: x / Protein: 100 / Nitrite: Negative   Leuk Esterase: Negative / RBC: 6-10 /HPF / WBC 0-2   Sq Epi: x / Non Sq Epi: Occasional / Bacteria: Occasional      CAPILLARY BLOOD GLUCOSE          11-20 @ 05:30   No growth to date.  --  --          MEDICATIONS  (STANDING):  enoxaparin Injectable 40 milliGRAM(s) SubCutaneous daily  influenza   Vaccine 0.5 milliLiter(s) IntraMuscular once  loratadine 10 milliGRAM(s) Oral daily  pantoprazole  Injectable 40 milliGRAM(s) IV Push daily  piperacillin/tazobactam IVPB.. 3.375 Gram(s) IV Intermittent every 8 hours  senna 2 Tablet(s) Oral at bedtime    MEDICATIONS  (PRN):  acetaminophen   Tablet .. 650 milliGRAM(s) Oral every 6 hours PRN Temp greater or equal to 38C (100.4F), Mild Pain (1 - 3)  HYDROmorphone  Injectable 1 milliGRAM(s) IV Push every 3 hours PRN Severe Pain (7 - 10)  oxyCODONE    IR 5 milliGRAM(s) Oral every 4 hours PRN Moderate Pain (4 - 6)

## 2020-11-21 NOTE — PROGRESS NOTE ADULT - SUBJECTIVE AND OBJECTIVE BOX
The patient was interviewed and evaluated.    70y Male    T(C): 37.1 (11-21-20 @ 11:07), Max: 37.1 (11-21-20 @ 11:07)  HR: 77 (11-21-20 @ 12:00) (65 - 82)  BP: 144/76 (11-21-20 @ 12:00) (107/62 - 144/76)  RR: 18 (11-21-20 @ 11:07) (12 - 18)  SpO2: 91% (11-21-20 @ 12:00) (91% - 99%)  Wt(kg): --    No Nausea/vomiting, recall, sore throat or headache.    Sitting up in bed with pain reported as minimal, 2/10. Clear liquids for now    No anesthesia related complaints or sequelae.

## 2020-11-21 NOTE — PROGRESS NOTE ADULT - SUBJECTIVE AND OBJECTIVE BOX
patient seen this AM in bed, state he is "improving", denies any overnight events , no N/V , no flatus, abdominal pain well controlled, Gomez in place, ha snot ambulated yet    T(F): 97.8 (11-21-20 @ 05:08), Max: 99.9 (11-20-20 @ 10:13)  HR: 75 (11-21-20 @ 05:08) (65 - 96)  BP: 133/73 (11-21-20 @ 05:08) (107/62 - 154/72)  RR: 18 (11-21-20 @ 05:08) (12 - 19)  SpO2: 92% (11-21-20 @ 05:08) (92% - 99%)  Wt(kg): --  CAPILLARY BLOOD GLUCOSE          PHYSICAL EXAM:  General: NAD, WDWN.   Neuro:  Alert & oriented x 3  CV: +S1+S2 regular rate and rhythm  Lung: clear to ausculation bilaterally, respirations nonlabored, good inspiratory effort  Abdomen: soft, non tympanic, + BS, sterie  intact, mild incisional tenderness , drain intact w non biliary  serosang drainage,    Extremities: no pedal edema or calf tenderness noted       LABS:                        11.3   16.92 )-----------( 212      ( 21 Nov 2020 06:35 )             32.8     11-21    140  |  106  |  22  ----------------------------<  122<H>  3.7   |  29  |  1.30    Ca    8.1<L>      21 Nov 2020 06:35  Phos  3.3     11-21  Mg     2.0     11-21    TPro  6.1  /  Alb  2.5<L>  /  TBili  0.8  /  DBili  x   /  AST  16  /  ALT  24  /  AlkPhos  50  11-21    PT/INR - ( 19 Nov 2020 20:15 )   PT: 14.9 sec;   INR: 1.29 ratio         PTT - ( 19 Nov 2020 20:15 )  PTT:32.8 sec  I&O's Detail    20 Nov 2020 07:01  -  21 Nov 2020 07:00  --------------------------------------------------------  IN:    IV PiggyBack: 25 mL    Lactated Ringers: 75 mL  Total IN: 100 mL    OUT:    Bulb (mL): 110 mL    Indwelling Catheter - Urethral (mL): 1680 mL  Total OUT: 1790 mL    Total NET: -1690 mL          Impression: 70y Male admitted with Acute cholecystitis      PMH Overactive bladder    SCC (squamous cell carcinoma)    Peripheral neuropathy    HTN (hypertension)        Plan:  -labs noted, wbc trending down, nl LFTs   -continue VTE prophylaxis   -Increase activity with PT, OOB, Ambulate   -Patient instructed on and encouraged incentive spirometry use  -continue local wound care  -d/c gomez, void check, patient with h/o overactive bladder   most likely to start clears this AM , will discuss with attending

## 2020-11-22 DIAGNOSIS — E87.6 HYPOKALEMIA: ICD-10-CM

## 2020-11-22 LAB
ALBUMIN SERPL ELPH-MCNC: 2.4 G/DL — LOW (ref 3.3–5)
ALP SERPL-CCNC: 47 U/L — SIGNIFICANT CHANGE UP (ref 40–120)
ALT FLD-CCNC: 24 U/L — SIGNIFICANT CHANGE UP (ref 12–78)
ANION GAP SERPL CALC-SCNC: 6 MMOL/L — SIGNIFICANT CHANGE UP (ref 5–17)
AST SERPL-CCNC: 13 U/L — LOW (ref 15–37)
BILIRUB SERPL-MCNC: 0.6 MG/DL — SIGNIFICANT CHANGE UP (ref 0.2–1.2)
BUN SERPL-MCNC: 25 MG/DL — HIGH (ref 7–23)
CALCIUM SERPL-MCNC: 8.6 MG/DL — SIGNIFICANT CHANGE UP (ref 8.5–10.1)
CHLORIDE SERPL-SCNC: 107 MMOL/L — SIGNIFICANT CHANGE UP (ref 96–108)
CO2 SERPL-SCNC: 29 MMOL/L — SIGNIFICANT CHANGE UP (ref 22–31)
CREAT SERPL-MCNC: 1.6 MG/DL — HIGH (ref 0.5–1.3)
GLUCOSE SERPL-MCNC: 110 MG/DL — HIGH (ref 70–99)
HCT VFR BLD CALC: 33 % — LOW (ref 39–50)
HGB BLD-MCNC: 11.1 G/DL — LOW (ref 13–17)
MCHC RBC-ENTMCNC: 28.7 PG — SIGNIFICANT CHANGE UP (ref 27–34)
MCHC RBC-ENTMCNC: 33.6 GM/DL — SIGNIFICANT CHANGE UP (ref 32–36)
MCV RBC AUTO: 85.3 FL — SIGNIFICANT CHANGE UP (ref 80–100)
NRBC # BLD: 0 /100 WBCS — SIGNIFICANT CHANGE UP (ref 0–0)
PLATELET # BLD AUTO: 243 K/UL — SIGNIFICANT CHANGE UP (ref 150–400)
POTASSIUM SERPL-MCNC: 3.3 MMOL/L — LOW (ref 3.5–5.3)
POTASSIUM SERPL-SCNC: 3.3 MMOL/L — LOW (ref 3.5–5.3)
PROT SERPL-MCNC: 6.1 G/DL — SIGNIFICANT CHANGE UP (ref 6–8.3)
RBC # BLD: 3.87 M/UL — LOW (ref 4.2–5.8)
RBC # FLD: 13.1 % — SIGNIFICANT CHANGE UP (ref 10.3–14.5)
SODIUM SERPL-SCNC: 142 MMOL/L — SIGNIFICANT CHANGE UP (ref 135–145)
WBC # BLD: 12.13 K/UL — HIGH (ref 3.8–10.5)
WBC # FLD AUTO: 12.13 K/UL — HIGH (ref 3.8–10.5)

## 2020-11-22 PROCEDURE — 99233 SBSQ HOSP IP/OBS HIGH 50: CPT | Mod: GC

## 2020-11-22 PROCEDURE — 99232 SBSQ HOSP IP/OBS MODERATE 35: CPT

## 2020-11-22 RX ORDER — POTASSIUM CHLORIDE 20 MEQ
40 PACKET (EA) ORAL ONCE
Refills: 0 | Status: COMPLETED | OUTPATIENT
Start: 2020-11-22 | End: 2020-11-22

## 2020-11-22 RX ORDER — OXYCODONE HYDROCHLORIDE 5 MG/1
5 TABLET ORAL EVERY 6 HOURS
Refills: 0 | Status: DISCONTINUED | OUTPATIENT
Start: 2020-11-22 | End: 2020-11-22

## 2020-11-22 RX ORDER — PANTOPRAZOLE SODIUM 20 MG/1
40 TABLET, DELAYED RELEASE ORAL
Refills: 0 | Status: DISCONTINUED | OUTPATIENT
Start: 2020-11-22 | End: 2020-11-23

## 2020-11-22 RX ORDER — SODIUM CHLORIDE 9 MG/ML
1000 INJECTION, SOLUTION INTRAVENOUS
Refills: 0 | Status: DISCONTINUED | OUTPATIENT
Start: 2020-11-22 | End: 2020-11-22

## 2020-11-22 RX ORDER — ACETAMINOPHEN 500 MG
650 TABLET ORAL EVERY 6 HOURS
Refills: 0 | Status: DISCONTINUED | OUTPATIENT
Start: 2020-11-22 | End: 2020-11-23

## 2020-11-22 RX ORDER — SODIUM CHLORIDE 9 MG/ML
1000 INJECTION, SOLUTION INTRAVENOUS
Refills: 0 | Status: DISCONTINUED | OUTPATIENT
Start: 2020-11-22 | End: 2020-11-23

## 2020-11-22 RX ADMIN — SENNA PLUS 2 TABLET(S): 8.6 TABLET ORAL at 22:59

## 2020-11-22 RX ADMIN — PIPERACILLIN AND TAZOBACTAM 25 GRAM(S): 4; .5 INJECTION, POWDER, LYOPHILIZED, FOR SOLUTION INTRAVENOUS at 15:21

## 2020-11-22 RX ADMIN — Medication 40 MILLIEQUIVALENT(S): at 12:38

## 2020-11-22 RX ADMIN — Medication 650 MILLIGRAM(S): at 12:41

## 2020-11-22 RX ADMIN — Medication 650 MILLIGRAM(S): at 17:53

## 2020-11-22 RX ADMIN — LORATADINE 10 MILLIGRAM(S): 10 TABLET ORAL at 22:59

## 2020-11-22 RX ADMIN — PIPERACILLIN AND TAZOBACTAM 25 GRAM(S): 4; .5 INJECTION, POWDER, LYOPHILIZED, FOR SOLUTION INTRAVENOUS at 05:25

## 2020-11-22 RX ADMIN — SODIUM CHLORIDE 75 MILLILITER(S): 9 INJECTION, SOLUTION INTRAVENOUS at 20:09

## 2020-11-22 RX ADMIN — Medication 650 MILLIGRAM(S): at 12:38

## 2020-11-22 RX ADMIN — PIPERACILLIN AND TAZOBACTAM 25 GRAM(S): 4; .5 INJECTION, POWDER, LYOPHILIZED, FOR SOLUTION INTRAVENOUS at 23:03

## 2020-11-22 RX ADMIN — Medication 650 MILLIGRAM(S): at 17:51

## 2020-11-22 RX ADMIN — PANTOPRAZOLE SODIUM 40 MILLIGRAM(S): 20 TABLET, DELAYED RELEASE ORAL at 08:55

## 2020-11-22 RX ADMIN — ENOXAPARIN SODIUM 40 MILLIGRAM(S): 100 INJECTION SUBCUTANEOUS at 12:38

## 2020-11-22 RX ADMIN — Medication 650 MILLIGRAM(S): at 23:09

## 2020-11-22 NOTE — PROGRESS NOTE ADULT - PROBLEM SELECTOR PLAN 7
chronic, stable  apprec cardio collaboration for periop care stable   takes COQ at home, no other medications  recommend lipid panel.
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
## 2020-11-22 NOTE — PROGRESS NOTE ADULT - SUBJECTIVE AND OBJECTIVE BOX
[facility] -- Kylie Owusu, Shana Silvestre, Darryl Castillo Savella, Goodger: Office # [phone]    Follow Up:  cardiac optimization pre/post procedure     Subjective/Observations: Patient seen and examined. Patient awake, alert, OOB to chair. No complaints of chest pain, dyspnea, palpitations or dizziness. No signs of orthopnea or PND. Continues to c/o abdominal discomfort especially with position changes. Tolerating O2 via nasal cannula.     REVIEW OF SYSTEMS: All review of systems is negative for eye, ENT, GI, , allergic, dermatologic, musculoskeletal and neurologic except as described above    PAST MEDICAL & SURGICAL HISTORY:  Overactive bladder  SCC (squamous cell carcinoma)  Peripheral neuropathy  HTN (hypertension)  Fibroid tongue /removed  Fracture of left great toe  Status post closed reduction with internal fixation right leg  Absence of tonsil  Detached retina, bilateral  History of Mohs micrographic surgery for squamous cell carcinoma in situ (SCCIS) of skin    MEDICATIONS  (STANDING):  acetaminophen   Tablet .. 650 milliGRAM(s) Oral every 6 hours  enoxaparin Injectable 40 milliGRAM(s) SubCutaneous daily  influenza   Vaccine 0.5 milliLiter(s) IntraMuscular once  loratadine 10 milliGRAM(s) Oral daily  pantoprazole    Tablet 40 milliGRAM(s) Oral before breakfast  piperacillin/tazobactam IVPB.. 3.375 Gram(s) IV Intermittent every 8 hours  senna 2 Tablet(s) Oral at bedtime    MEDICATIONS  (PRN):  oxyCODONE    IR 5 milliGRAM(s) Oral every 6 hours PRN Severe Pain (7 - 10)    Allergies  No Known Drug Allergies  Seafood (Diarrhea)    Vital Signs Last 24 Hrs  T(C): 36.6 (22 Nov 2020 09:55), Max: 37.3 (21 Nov 2020 18:06)  T(F): 97.9 (22 Nov 2020 09:55), Max: 99.2 (21 Nov 2020 18:06)  HR: 73 (22 Nov 2020 09:55) (65 - 77)  BP: 147/72 (22 Nov 2020 09:55) (114/68 - 147/72)  BP(mean): --  RR: 18 (22 Nov 2020 09:55) (18 - 18)  SpO2: 93% (22 Nov 2020 09:55) (91% - 95%)  I&O's Summary    21 Nov 2020 07:01  -  22 Nov 2020 07:00  --------------------------------------------------------  IN: 2420 mL / OUT: 1950 mL / NET: 470 mL    TELE: Not on telemetry   PHYSICAL EXAM:  Appearance: NAD, no distress, alert, Well developed   HEENT: Moist Mucous Membranes, Anicteric  Cardiovascular: Regular rate and rhythm, Normal S1 S2, No JVD, No murmurs, No rubs, gallops or clicks  Respiratory: Non-labored, Clear to auscultation, No rales, No rhonchi, No wheezing.   Gastrointestinal:  Soft, Non-tender, + BS  Neurologic: Non-focal  Skin: Warm and dry, + abdominal incision, No visible rashes or ulcers, No ecchymosis, No cyanosis  Musculoskeletal: No clubbing, No cyanosis, No joint swelling/tenderness  Psychiatry: Mood & affect appropriate  Lymph: No peripheral edema.     LABS: All Labs Reviewed:                        11.1   12.13 )-----------( 243      ( 22 Nov 2020 06:35 )             33.0                         11.3   16.92 )-----------( 212      ( 21 Nov 2020 06:35 )             32.8                         12.8   24.84 )-----------( 228      ( 20 Nov 2020 05:01 )             38.1     22 Nov 2020 06:35    142    |  107    |  25     ----------------------------<  110    3.3     |  29     |  1.60   21 Nov 2020 06:35    140    |  106    |  22     ----------------------------<  122    3.7     |  29     |  1.30   20 Nov 2020 05:01    138    |  103    |  18     ----------------------------<  143    4.3     |  29     |  1.40     Ca    8.6        22 Nov 2020 06:35  Ca    8.1        21 Nov 2020 06:35  Ca    8.7        20 Nov 2020 05:01  Phos  3.3       21 Nov 2020 06:35  Mg     2.0       21 Nov 2020 06:35    TPro  6.1    /  Alb  2.4    /  TBili  0.6    /  DBili  x      /  AST  13     /  ALT  24     /  AlkPhos  47     22 Nov 2020 06:35  TPro  6.1    /  Alb  2.5    /  TBili  0.8    /  DBili  x      /  AST  16     /  ALT  24     /  AlkPhos  50     21 Nov 2020 06:35  TPro  6.8    /  Alb  3.2    /  TBili  1.7    /  DBili  x      /  AST  14     /  ALT  17     /  AlkPhos  56     20 Nov 2020 05:01    12 Lead ECG:   Ventricular Rate 88 BPM  Atrial Rate 88 BPM  P-R Interval 164 ms  QRS Duration 76 ms  Q-T Interval 354 ms  QTC Calculation(Bazett) 428 ms  P Axis 35 degrees  R Axis -11 degrees  T Axis 27 degrees  Diagnosis Line Normal sinus rhythm  Normal ECG  No previous ECGs available  Confirmed by ALEXANDRA EPSTEIN ([id number]) on 11/21/2020 3:36:34 PM (11-19-20 @ 19:37)    < from: Xray Chest 1 View AP/PA (11.19.20 @ 19:32) >  AP chest.  Heart magnified by AP film shallow inspiration. Faint calcification aortic arch. Left costophrenic angle excluded. Grossly clear lungs.  Impression: Grossly clear lungs  < end of copied text >

## 2020-11-22 NOTE — PROGRESS NOTE ADULT - PROBLEM SELECTOR PLAN 2
chronic, stable  cont home meds  continue home losartan 100 mg QD, amlodipine 5 mg QD with hold parameters., adjust as clincially appropiate, pain control first sec to npo, diet advancing  repleted with 40meq  check mag and phosph if still low tomorrow  trend and replete prn
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
## 2020-11-22 NOTE — PROGRESS NOTE ADULT - PROBLEM SELECTOR PLAN 6
stable   takes COQ at home, no other medications  recommend lipid panel. hx of detached retina b/l, s/p repair  conitne home Latanoprost eye drops.

## 2020-11-22 NOTE — PROGRESS NOTE ADULT - SUBJECTIVE AND OBJECTIVE BOX
Patient is a 70y old  Male who presents with a chief complaint of acute cholecystitis (22 Nov 2020 10:21)      INTERVAL HPI: Pt seen and examined. States he is feeling better today, went for walk  last night into [facility] but was somewhat fatigued afterwards. Denies any other acute complaints at this time.     OVERNIGHT EVENTS: none noted  T(F): 99.5 (11-22-20 @ 13:14), Max: 99.5 (11-22-20 @ 13:14)  HR: 76 (11-22-20 @ 13:14) (65 - 76)  BP: 145/76 (11-22-20 @ 13:14) (136/80 - 147/72)  RR: 18 (11-22-20 @ 13:14) (18 - 18)  SpO2: 94% (11-22-20 @ 13:14) (93% - 95%)  I&O's Summary    21 Nov 2020 07:01  -  22 Nov 2020 07:00  --------------------------------------------------------  IN: 2420 mL / OUT: 1950 mL / NET: 470 mL    22 Nov 2020 07:01  -  22 Nov 2020 18:16  --------------------------------------------------------  IN: 1090 mL / OUT: 20 mL / NET: 1070 mL        REVIEW OF SYSTEMS:  CONSTITUTIONAL: No fever, weight loss, or fatigue  RESPIRATORY: No cough, wheezing, chills or hemoptysis; No shortness of breath  CARDIOVASCULAR: No chest pain, palpitations, dizziness, or leg swelling  GASTROINTESTINAL: No abdominal or epigastric pain. No nausea, vomiting, or hematemesis; No diarrhea or constipation. No melena or hematochezia.  GENITOURINARY: No dysuria, frequency, hematuria, or incontinence  NEUROLOGICAL: No headaches, memory loss, loss of strength, numbness, or tremors  SKIN: No itching, burning, rashes, or lesions   MUSCULOSKELETAL: No joint pain or swelling; No muscle, back, or extremity pain  PSYCHIATRIC: No depression, anxiety, mood swings, or difficulty sleeping      PHYSICAL EXAM:  GENERAL: NAD, well-groomed, obese  NERVOUS SYSTEM:  Alert & Oriented X3, Good concentration; Motor Strength 5/5 B/L upper and lower extremities; DTRs 2+ intact and symmetric  CHEST/LUNG: Clear to auscultation bilaterally; No rales, rhonchi, wheezing, or rubs  HEART: Regular rate and rhythm; No murmurs, rubs, or gallops  ABDOMEN: Soft, Nontender, distended; Bowel sounds present, incisions appear c/d/i  EXTREMITIES:  2+ Peripheral Pulses, No clubbing, cyanosis, or edema  SKIN: No rashes or lesions    LABS:                        11.1   12.13 )-----------( 243      ( 22 Nov 2020 06:35 )             33.0     11-22    142  |  107  |  25<H>  ----------------------------<  110<H>  3.3<L>   |  29  |  1.60<H>    Ca    8.6      22 Nov 2020 06:35  Phos  3.3     11-21  Mg     2.0     11-21    TPro  6.1  /  Alb  2.4<L>  /  TBili  0.6  /  DBili  x   /  AST  13<L>  /  ALT  24  /  AlkPhos  47  11-22        CAPILLARY BLOOD GLUCOSE          11-20 @ 05:30   No growth to date.  --  --          MEDICATIONS  (STANDING):  acetaminophen   Tablet .. 650 milliGRAM(s) Oral every 6 hours  enoxaparin Injectable 40 milliGRAM(s) SubCutaneous daily  influenza   Vaccine 0.5 milliLiter(s) IntraMuscular once  loratadine 10 milliGRAM(s) Oral daily  pantoprazole    Tablet 40 milliGRAM(s) Oral before breakfast  piperacillin/tazobactam IVPB.. 3.375 Gram(s) IV Intermittent every 8 hours  senna 2 Tablet(s) Oral at bedtime    MEDICATIONS  (PRN):

## 2020-11-22 NOTE — PROGRESS NOTE ADULT - SUBJECTIVE AND OBJECTIVE BOX
STATUS POST:  partial cholecystectomy with RODRIGUEZ drain placement    POST OPERATIVE DAY #: 2    SUBJECTIVE:  Patient seen and examined at bedside.  No overnight events.  Patient with no new complaints at this time, tolerating clear diet, admits to flatus and bowel movement.  Patient denies any fevers, chills, chest pain, shortness of breath, abdominal pain, nausea, vomiting or diarrhea.    Vital Signs Last 24 Hrs  T(C): 36.6 (22 Nov 2020 04:32), Max: 37.3 (21 Nov 2020 18:06)  T(F): 97.9 (22 Nov 2020 04:32), Max: 99.2 (21 Nov 2020 18:06)  HR: 69 (22 Nov 2020 04:32) (65 - 77)  BP: 137/85 (22 Nov 2020 04:32) (114/68 - 146/80)  BP(mean): --  RR: 18 (22 Nov 2020 04:32) (18 - 18)  SpO2: 94% (22 Nov 2020 04:32) (91% - 95%)    PHYSICAL EXAM:  GENERAL: No acute distress, well-developed  HEAD:  Atraumatic, Normocephalic  ABDOMEN: Soft, mildly-tender, minimally-distended; bowel sounds+ RODRIGUEZ drain serosanguinous  NEUROLOGY: A&O x 3, no focal deficits    I&O's Summary    21 Nov 2020 07:01  -  22 Nov 2020 07:00  --------------------------------------------------------  IN: 2420 mL / OUT: 1950 mL / NET: 470 mL      I&O's Detail    21 Nov 2020 07:01  -  22 Nov 2020 07:00  --------------------------------------------------------  IN:    IV PiggyBack: 100 mL    Lactated Ringers: 1000 mL    Oral Fluid: 1320 mL  Total IN: 2420 mL    OUT:    Bulb (mL): 50 mL    Indwelling Catheter - Urethral (mL): 500 mL    Voided (mL): 1400 mL  Total OUT: 1950 mL    Total NET: 470 mL        MEDICATIONS  (STANDING):  acetaminophen   Tablet .. 650 milliGRAM(s) Oral every 6 hours  enoxaparin Injectable 40 milliGRAM(s) SubCutaneous daily  influenza   Vaccine 0.5 milliLiter(s) IntraMuscular once  loratadine 10 milliGRAM(s) Oral daily  pantoprazole    Tablet 40 milliGRAM(s) Oral before breakfast  piperacillin/tazobactam IVPB.. 3.375 Gram(s) IV Intermittent every 8 hours  senna 2 Tablet(s) Oral at bedtime    MEDICATIONS  (PRN):  oxyCODONE    IR 5 milliGRAM(s) Oral every 6 hours PRN Severe Pain (7 - 10)    LABS:                        11.1   12.13 )-----------( 243      ( 22 Nov 2020 06:35 )             33.0     11-22    142  |  107  |  25<H>  ----------------------------<  110<H>  3.3<L>   |  29  |  1.60<H>    Ca    8.6      22 Nov 2020 06:35  Phos  3.3     11-21  Mg     2.0     11-21    TPro  6.1  /  Alb  2.4<L>  /  TBili  0.6  /  DBili  x   /  AST  13<L>  /  ALT  24  /  AlkPhos  47  11-22        RADIOLOGY & ADDITIONAL STUDIES:    ASSESSMENT    70y Male    PLAN  - Will discuss with    - incentive spirometer  - pain control  - OOB  - NPO, IVF   - serial abdominal exams  - labs in am    Surgical Team Contact Information  Spectralink: Ext: [phone] or [phone]  Pager: [phone] STATUS POST:  partial cholecystectomy with RODRIGUEZ drain placement    POST OPERATIVE DAY #: 2    SUBJECTIVE:  Patient seen and examined at bedside.  No overnight events.  Patient with no new complaints at this time, tolerating clear diet, admits to flatus and bowel movement.  Patient denies any fevers, chills, chest pain, shortness of breath, abdominal pain, nausea, vomiting or diarrhea.    Vital Signs Last 24 Hrs  T(C): 36.6 (22 Nov 2020 04:32), Max: 37.3 (21 Nov 2020 18:06)  T(F): 97.9 (22 Nov 2020 04:32), Max: 99.2 (21 Nov 2020 18:06)  HR: 69 (22 Nov 2020 04:32) (65 - 77)  BP: 137/85 (22 Nov 2020 04:32) (114/68 - 146/80)  BP(mean): --  RR: 18 (22 Nov 2020 04:32) (18 - 18)  SpO2: 94% (22 Nov 2020 04:32) (91% - 95%)    PHYSICAL EXAM:  GENERAL: No acute distress, well-developed  HEAD:  Atraumatic, Normocephalic  ABDOMEN: Soft, mildly-tender, mildly-distended; bowel sounds+ RODRIGUEZ drain serosanguinous  NEUROLOGY: A&O x 3, no focal deficits    I&O's Summary    21 Nov 2020 07:01  -  22 Nov 2020 07:00  --------------------------------------------------------  IN: 2420 mL / OUT: 1950 mL / NET: 470 mL      I&O's Detail    21 Nov 2020 07:01  -  22 Nov 2020 07:00  --------------------------------------------------------  IN:    IV PiggyBack: 100 mL    Lactated Ringers: 1000 mL    Oral Fluid: 1320 mL  Total IN: 2420 mL    OUT:    Bulb (mL): 50 mL    Indwelling Catheter - Urethral (mL): 500 mL    Voided (mL): 1400 mL  Total OUT: 1950 mL    Total NET: 470 mL        MEDICATIONS  (STANDING):  acetaminophen   Tablet .. 650 milliGRAM(s) Oral every 6 hours  enoxaparin Injectable 40 milliGRAM(s) SubCutaneous daily  influenza   Vaccine 0.5 milliLiter(s) IntraMuscular once  loratadine 10 milliGRAM(s) Oral daily  pantoprazole    Tablet 40 milliGRAM(s) Oral before breakfast  piperacillin/tazobactam IVPB.. 3.375 Gram(s) IV Intermittent every 8 hours  senna 2 Tablet(s) Oral at bedtime    MEDICATIONS  (PRN):  oxyCODONE    IR 5 milliGRAM(s) Oral every 6 hours PRN Severe Pain (7 - 10)    LABS:                        11.1   12.13 )-----------( 243      ( 22 Nov 2020 06:35 )             33.0     11-22    142  |  107  |  25<H>  ----------------------------<  110<H>  3.3<L>   |  29  |  1.60<H>    Ca    8.6      22 Nov 2020 06:35  Phos  3.3     11-21  Mg     2.0     11-21    TPro  6.1  /  Alb  2.4<L>  /  TBili  0.6  /  DBili  x   /  AST  13<L>  /  ALT  24  /  AlkPhos  47  11-22    RADIOLOGY & ADDITIONAL STUDIES:  no new    ASSESSMENT    70y Male s/p partial cholecystectomy with drain placement tolerating FLD.    PLAN  - Discussed and seen with Dr. Rivera, covering for Dr. Healy  -Pt michael FLD now, with some mild distention, will monitor  -cont senna and d/c'd narcotics to prevent ileus  - incentive spirometer  - pain control  - OOB  - serial abdominal exams  - labs in am    Surgical Team Contact Information  Spectralink: Ext: [phone] or [phone]  Pager: [phone]

## 2020-11-22 NOTE — PROGRESS NOTE ADULT - PROBLEM SELECTOR PLAN 3
chronic, stable   cont home meds chronic, stable  cont home meds  continue home losartan 100 mg QD, amlodipine 5 mg QD with hold parameters., adjust as clincially appropiate, pain control first

## 2020-11-22 NOTE — PROGRESS NOTE ADULT - PROBLEM SELECTOR PLAN 9
scds b/l for dvt ppx [place] on CKD stage 3   cr increased today cr 1.6, start ivf for 12hrs  avooid nephrotoxins  trend renal function  encourage po,  monitor i/os  if continues to uptrend cosnider nephro consult

## 2020-11-22 NOTE — PROGRESS NOTE ADULT - PROBLEM SELECTOR PLAN 8
israel on CKD stage 3 a  avooid nephrotoxins  trend renal function  encourage po,  monitor i/os [place] on CKD stage 3   cr increased today cr 1.6, start ivf for 12hrs  avooid nephrotoxins  trend renal function  encourage po,  monitor i/os  if continues to uptrend cosnider nephro consult chronic, stable  apprec cardio collaboration for periop care

## 2020-11-23 ENCOUNTER — TRANSCRIPTION ENCOUNTER (OUTPATIENT)
Age: 70
End: 2020-11-23

## 2020-11-23 VITALS — HEART RATE: 78 BPM | DIASTOLIC BLOOD PRESSURE: 78 MMHG | SYSTOLIC BLOOD PRESSURE: 150 MMHG

## 2020-11-23 LAB
ANION GAP SERPL CALC-SCNC: 5 MMOL/L — SIGNIFICANT CHANGE UP (ref 5–17)
BUN SERPL-MCNC: 22 MG/DL — SIGNIFICANT CHANGE UP (ref 7–23)
CALCIUM SERPL-MCNC: 8.4 MG/DL — LOW (ref 8.5–10.1)
CHLORIDE SERPL-SCNC: 108 MMOL/L — SIGNIFICANT CHANGE UP (ref 96–108)
CO2 SERPL-SCNC: 30 MMOL/L — SIGNIFICANT CHANGE UP (ref 22–31)
CREAT SERPL-MCNC: 1.4 MG/DL — HIGH (ref 0.5–1.3)
GLUCOSE SERPL-MCNC: 99 MG/DL — SIGNIFICANT CHANGE UP (ref 70–99)
HCT VFR BLD CALC: 32.8 % — LOW (ref 39–50)
HGB BLD-MCNC: 11 G/DL — LOW (ref 13–17)
MCHC RBC-ENTMCNC: 28.6 PG — SIGNIFICANT CHANGE UP (ref 27–34)
MCHC RBC-ENTMCNC: 33.5 GM/DL — SIGNIFICANT CHANGE UP (ref 32–36)
MCV RBC AUTO: 85.2 FL — SIGNIFICANT CHANGE UP (ref 80–100)
NRBC # BLD: 0 /100 WBCS — SIGNIFICANT CHANGE UP (ref 0–0)
PLATELET # BLD AUTO: 246 K/UL — SIGNIFICANT CHANGE UP (ref 150–400)
POTASSIUM SERPL-MCNC: 3.3 MMOL/L — LOW (ref 3.5–5.3)
POTASSIUM SERPL-SCNC: 3.3 MMOL/L — LOW (ref 3.5–5.3)
RBC # BLD: 3.85 M/UL — LOW (ref 4.2–5.8)
RBC # FLD: 13 % — SIGNIFICANT CHANGE UP (ref 10.3–14.5)
SODIUM SERPL-SCNC: 143 MMOL/L — SIGNIFICANT CHANGE UP (ref 135–145)
WBC # BLD: 9.6 K/UL — SIGNIFICANT CHANGE UP (ref 3.8–10.5)
WBC # FLD AUTO: 9.6 K/UL — SIGNIFICANT CHANGE UP (ref 3.8–10.5)

## 2020-11-23 PROCEDURE — 81001 URINALYSIS AUTO W/SCOPE: CPT

## 2020-11-23 PROCEDURE — 99232 SBSQ HOSP IP/OBS MODERATE 35: CPT

## 2020-11-23 PROCEDURE — 86900 BLOOD TYPING SEROLOGIC ABO: CPT

## 2020-11-23 PROCEDURE — 82962 GLUCOSE BLOOD TEST: CPT

## 2020-11-23 PROCEDURE — U0003: CPT

## 2020-11-23 PROCEDURE — 96365 THER/PROPH/DIAG IV INF INIT: CPT

## 2020-11-23 PROCEDURE — 93005 ELECTROCARDIOGRAM TRACING: CPT

## 2020-11-23 PROCEDURE — 86769 SARS-COV-2 COVID-19 ANTIBODY: CPT

## 2020-11-23 PROCEDURE — 84100 ASSAY OF PHOSPHORUS: CPT

## 2020-11-23 PROCEDURE — C1889: CPT

## 2020-11-23 PROCEDURE — 76705 ECHO EXAM OF ABDOMEN: CPT

## 2020-11-23 PROCEDURE — 99233 SBSQ HOSP IP/OBS HIGH 50: CPT

## 2020-11-23 PROCEDURE — 99285 EMERGENCY DEPT VISIT HI MDM: CPT | Mod: 25

## 2020-11-23 PROCEDURE — 74177 CT ABD & PELVIS W/CONTRAST: CPT

## 2020-11-23 PROCEDURE — 97162 PT EVAL MOD COMPLEX 30 MIN: CPT

## 2020-11-23 PROCEDURE — 36415 COLL VENOUS BLD VENIPUNCTURE: CPT

## 2020-11-23 PROCEDURE — 88304 TISSUE EXAM BY PATHOLOGIST: CPT

## 2020-11-23 PROCEDURE — 85730 THROMBOPLASTIN TIME PARTIAL: CPT

## 2020-11-23 PROCEDURE — 96375 TX/PRO/DX INJ NEW DRUG ADDON: CPT

## 2020-11-23 PROCEDURE — 85025 COMPLETE CBC W/AUTO DIFF WBC: CPT

## 2020-11-23 PROCEDURE — 85027 COMPLETE CBC AUTOMATED: CPT

## 2020-11-23 PROCEDURE — 80053 COMPREHEN METABOLIC PANEL: CPT

## 2020-11-23 PROCEDURE — 71045 X-RAY EXAM CHEST 1 VIEW: CPT

## 2020-11-23 PROCEDURE — 86803 HEPATITIS C AB TEST: CPT

## 2020-11-23 PROCEDURE — 86850 RBC ANTIBODY SCREEN: CPT

## 2020-11-23 PROCEDURE — 85610 PROTHROMBIN TIME: CPT

## 2020-11-23 PROCEDURE — 80048 BASIC METABOLIC PNL TOTAL CA: CPT

## 2020-11-23 PROCEDURE — 86901 BLOOD TYPING SEROLOGIC RH(D): CPT

## 2020-11-23 PROCEDURE — 97116 GAIT TRAINING THERAPY: CPT

## 2020-11-23 PROCEDURE — 84145 PROCALCITONIN (PCT): CPT

## 2020-11-23 PROCEDURE — 97530 THERAPEUTIC ACTIVITIES: CPT

## 2020-11-23 PROCEDURE — 83690 ASSAY OF LIPASE: CPT

## 2020-11-23 PROCEDURE — 87040 BLOOD CULTURE FOR BACTERIA: CPT

## 2020-11-23 PROCEDURE — 83735 ASSAY OF MAGNESIUM: CPT

## 2020-11-23 RX ORDER — POTASSIUM CHLORIDE 20 MEQ
20 PACKET (EA) ORAL
Refills: 0 | Status: COMPLETED | OUTPATIENT
Start: 2020-11-23 | End: 2020-11-23

## 2020-11-23 RX ORDER — AMLODIPINE BESYLATE 2.5 MG/1
5 TABLET ORAL DAILY
Refills: 0 | Status: DISCONTINUED | OUTPATIENT
Start: 2020-11-23 | End: 2020-11-23

## 2020-11-23 RX ORDER — OXYCODONE HYDROCHLORIDE 5 MG/1
1 TABLET ORAL
Qty: 12 | Refills: 0
Start: 2020-11-23 | End: 2020-11-25

## 2020-11-23 RX ORDER — LACTOBACILLUS ACIDOPHILUS 100MM CELL
1 CAPSULE ORAL
Qty: 14 | Refills: 0
Start: 2020-11-23 | End: 2020-11-29

## 2020-11-23 RX ORDER — DOCUSATE SODIUM 100 MG
1 CAPSULE ORAL
Qty: 21 | Refills: 0
Start: 2020-11-23 | End: 2020-11-29

## 2020-11-23 RX ADMIN — LORATADINE 10 MILLIGRAM(S): 10 TABLET ORAL at 11:47

## 2020-11-23 RX ADMIN — AMLODIPINE BESYLATE 5 MILLIGRAM(S): 2.5 TABLET ORAL at 16:20

## 2020-11-23 RX ADMIN — ENOXAPARIN SODIUM 40 MILLIGRAM(S): 100 INJECTION SUBCUTANEOUS at 11:46

## 2020-11-23 RX ADMIN — Medication 20 MILLIEQUIVALENT(S): at 10:31

## 2020-11-23 RX ADMIN — Medication 650 MILLIGRAM(S): at 11:45

## 2020-11-23 RX ADMIN — Medication 650 MILLIGRAM(S): at 06:04

## 2020-11-23 RX ADMIN — Medication 650 MILLIGRAM(S): at 00:01

## 2020-11-23 RX ADMIN — Medication 20 MILLIEQUIVALENT(S): at 11:48

## 2020-11-23 RX ADMIN — PANTOPRAZOLE SODIUM 40 MILLIGRAM(S): 20 TABLET, DELAYED RELEASE ORAL at 06:04

## 2020-11-23 RX ADMIN — PIPERACILLIN AND TAZOBACTAM 25 GRAM(S): 4; .5 INJECTION, POWDER, LYOPHILIZED, FOR SOLUTION INTRAVENOUS at 06:05

## 2020-11-23 NOTE — PROGRESS NOTE ADULT - PROBLEM SELECTOR PLAN 1
-POD#1, s/p lap karma  -trend lfts  -cont antibx  -advance diet as per surg  -pain control prn
-POD#2, s/p lap karma  -trend lfts  -cont antibx  -advance diet as per surg  -pain control prn
-plan as per gen surg  -cont iv antibx  -pain control
-POD#3, s/p lap karma  -trend lfts  -cont antibx  -advance diet as per surg  -pain control prn

## 2020-11-23 NOTE — PROGRESS NOTE ADULT - SUBJECTIVE AND OBJECTIVE BOX
Patient was seen lying comfortably in bed. No acute events overnight.  Pt admits to mild pain but states it is tolerable with meds  Is tolerating diet well without any nausea or vomiting.  Ambulating well. Voiding without difficulty. Pt has passed flatus, - BM overnight/yesterday.   Pt is denying any chest pain, sob, dizziness, weakness       T(C): 36.7 (11-23-20 @ 05:05), Max: 37.5 (11-22-20 @ 13:14)  HR: 63 (11-23-20 @ 05:05) (63 - 79)  BP: 137/87 (11-23-20 @ 05:05) (124/77 - 147/72)  RR: 18 (11-23-20 @ 05:05) (18 - 18)  SpO2: 93% (11-23-20 @ 05:05) (93% - 94%)      11-22-20 @ 07:01  -  11-23-20 @ 07:00  --------------------------------------------------------  IN: 2040 mL / OUT: 952 mL / NET: 1088 mL    PE  General: Pt is lying in bed, NAD, A+O x 3  Cardio: RRR  Lungs: NLB  Incision: is clean dry and intact. Belly is soft, obese, mildly distended, and nontender to palpation  Ext: Calves are soft and non tender to palpation b/l.  ( - ) edema    ASSESSMENT  71 y/o M POD 3 s/p partial cholecystectomy       PLAN  Antibiotics- Zosyn  Respiration- IS use encouraged   Ambulation- OOB as tolerated  Diet- FLD, possibly advance to Low fat today, IVF  Pain meds PRN  continue senna   discharge planning    Patient was seen lying comfortably in bed. No acute events overnight.  Pt admits to mild pain but states it is tolerable with meds  Is tolerating diet well without any nausea or vomiting.  Ambulating well. Voiding without difficulty. Pt has passed flatus, - BM overnight/yesterday.   Pt is denying any chest pain, sob, dizziness, weakness       T(C): 36.7 (11-23-20 @ 05:05), Max: 37.5 (11-22-20 @ 13:14)  HR: 63 (11-23-20 @ 05:05) (63 - 79)  BP: 137/87 (11-23-20 @ 05:05) (124/77 - 147/72)  RR: 18 (11-23-20 @ 05:05) (18 - 18)  SpO2: 93% (11-23-20 @ 05:05) (93% - 94%)      11-22-20 @ 07:01  -  11-23-20 @ 07:00  --------------------------------------------------------  IN: 2040 mL / OUT: 952 mL / NET: 1088 mL      PE  General: Pt is lying in bed, NAD, A+O x 3  Cardio: RRR  Lungs: NLB  Incision: is clean dry and intact. Belly is soft, obese, mildly distended, and nontender to palpation  Ext: Calves are soft and non tender to palpation b/l.  ( - ) edema      ASSESSMENT  69 y/o M POD 3 s/p partial cholecystectomy       PLAN  Antibiotics- Zosyn  Respiration- IS use encouraged   Ambulation- OOB as tolerated  Diet- FLD, possibly advance to Low fat today, IVF  Pain meds PRN  continue senna   discharge planning

## 2020-11-23 NOTE — DISCHARGE NOTE PROVIDER - HOSPITAL COURSE
71 y/o M with PMHx of HTN, overactive bladder, MVP, peripheral neuropathy, hx of bilateral detached retinas, presented to [place] ED on 11/19/20 with abdominal pain x 2 days. Pain located in RUQ and described as constant ache with intermittent sharp pains, 7/10 in severity at its worst, nonradiating. Pain is currently 3/10 after pain medication given in ER. Patient admitted to vomiting after eating.    Denies fever, chills, chest pain, palpitations, shortness of breath, current nausea, urinary complaints. Patient had US of gallbladder which showed Findings suggestive of acute cholecystitis. CT of the abdomen and pelvis was completed which showed a distended gallbladder containing calcified gallstones and with mild gallbladder wall thickening, pericholecystic fat stranding and trace pericholecystic fluid.  Patient underwent lap partial cholecystitis on 11/20/20 and in the OR was consistent with advanced/ complex cholecystitis, RODRIGUEZ drain left in place post op. During admission patient had hypokalemia likely due to IV fluid administration and poor PO intake which was treated with potassium replacement with follow up labs completed. Patient doing well post op, to be sent home on oral antibiotics and pain medication. HD stable, to be discharged home with VNS.

## 2020-11-23 NOTE — DISCHARGE NOTE NURSING/CASE MANAGEMENT/SOCIAL WORK - PATIENT PORTAL LINK FT
You can access the FollowMyHealth Patient Portal offered by [facility] by registering at the following website: [email or web address]. By joining [facility] FollowMyHealth portal, you will also be able to view your health information using other applications (apps) compatible with our system.

## 2020-11-23 NOTE — PROGRESS NOTE ADULT - REASON FOR ADMISSION
acute cholecystitis

## 2020-11-23 NOTE — PROGRESS NOTE ADULT - NSHPATTENDINGPLANDISCUSS_GEN_ALL_CORE
patient in detail, wife by phone, Surgery PA and today's RN.
patient in detail, wife by phone, ER at time of admission and Surgery PA.
DINORA Little
Andres surg PA, RN Anabel
2E IDR team. gen surg MOHINI Avelar
Eric Zamora, Nurse manager Ginger

## 2020-11-23 NOTE — PROGRESS NOTE ADULT - SUBJECTIVE AND OBJECTIVE BOX
[facility] -- Kylie Owusu, Shana Silvestre, Darryl Castillo Savella  Office # [phone]      Follow Up:    Cardiac clearance   Subjective/Observations:   No events overnight resting comfortably in bed.  No complaints of chest pain, dyspnea, or palpitations reported. No signs of orthopnea or PND.     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 650 milliGRAM(s) Oral every 6 hours  enoxaparin Injectable 40 milliGRAM(s) SubCutaneous daily  influenza   Vaccine 0.5 milliLiter(s) IntraMuscular once  lactated ringers. 1000 milliLiter(s) (75 mL/Hr) IV Continuous <Continuous>  loratadine 10 milliGRAM(s) Oral daily  pantoprazole    Tablet 40 milliGRAM(s) Oral before breakfast  piperacillin/tazobactam IVPB.. 3.375 Gram(s) IV Intermittent every 8 hours  potassium chloride    Tablet ER 20 milliEquivalent(s) Oral every 2 hours  senna 2 Tablet(s) Oral at bedtime    MEDICATIONS  (PRN):      Allergies    No Known Drug Allergies  Seafood (Diarrhea)    Intolerances        Vital Signs Last 24 Hrs  T(C): 36.7 (23 Nov 2020 05:05), Max: 37.5 (22 Nov 2020 13:14)  T(F): 98 (23 Nov 2020 05:05), Max: 99.5 (22 Nov 2020 13:14)  HR: 63 (23 Nov 2020 05:05) (63 - 79)  BP: 137/87 (23 Nov 2020 05:05) (124/77 - 147/72)  BP(mean): --  RR: 18 (23 Nov 2020 05:05) (18 - 18)  SpO2: 93% (23 Nov 2020 05:05) (93% - 94%)    I&O's Summary    22 Nov 2020 07:01  -  23 Nov 2020 07:00  --------------------------------------------------------  IN: 2040 mL / OUT: 952 mL / NET: 1088 mL          PHYSICAL EXAM:  TELE: Off tele   Constitutional: NAD, awake and alert, well-developed  HEENT: Moist Mucous Membranes, Anicteric  Pulmonary: Non-labored, breath sounds are clear bilaterally, No wheezing, crackles or rhonchi  Cardiovascular: Regular, S1 and S2 nl, No murmurs, rubs, gallops or clicks  Gastrointestinal: Bowel Sounds present, soft, nontender.   Lymph: No lymphadenopathy. No peripheral edema.  Skin: No visible rashes or ulcers.  Psych:  Mood & affect appropriate    LABS: All Labs Reviewed:                        11.0   9.60  )-----------( 246      ( 23 Nov 2020 07:33 )             32.8                         11.1   12.13 )-----------( 243      ( 22 Nov 2020 06:35 )             33.0                         11.3   16.92 )-----------( 212      ( 21 Nov 2020 06:35 )             32.8     23 Nov 2020 07:33    143    |  108    |  22     ----------------------------<  99     3.3     |  30     |  1.40   22 Nov 2020 06:35    142    |  107    |  25     ----------------------------<  110    3.3     |  29     |  1.60   21 Nov 2020 06:35    140    |  106    |  22     ----------------------------<  122    3.7     |  29     |  1.30     Ca    8.4        23 Nov 2020 07:33  Ca    8.6        22 Nov 2020 06:35  Ca    8.1        21 Nov 2020 06:35  Phos  3.3       21 Nov 2020 06:35  Mg     2.0       21 Nov 2020 06:35    TPro  6.1    /  Alb  2.4    /  TBili  0.6    /  DBili  x      /  AST  13     /  ALT  24     /  AlkPhos  47     22 Nov 2020 06:35  TPro  6.1    /  Alb  2.5    /  TBili  0.8    /  DBili  x      /  AST  16     /  ALT  24     /  AlkPhos  50     21 Nov 2020 06:35      12 Lead ECG:   Ventricular Rate 88 BPM  Atrial Rate 88 BPM  P-R Interval 164 ms  QRS Duration 76 ms  Q-T Interval 354 ms  QTC Calculation(Bazett) 428 ms  P Axis 35 degrees  R Axis -11 degrees  T Axis 27 degrees  Diagnosis Line Normal sinus rhythm  Normal ECG  No previous ECGs available  Confirmed by ALEXANDRA EPSTEIN ([id number]) on 11/21/2020 3:36:34 PM (11-19-20 @ 19:37)    < from: Xray Chest 1 View AP/PA (11.19.20 @ 19:32) >  AP chest.  Heart magnified by AP film shallow inspiration. Faint calcification aortic arch. Left costophrenic angle excluded. Grossly clear lungs.  Impression: Grossly clear lungs  < end of copied text >

## 2020-11-23 NOTE — DISCHARGE NOTE PROVIDER - CARE PROVIDER_API CALL
Miguel Angel Healy J  SURGERY  [address], [place] [id number]  Phone: [phone]  Fax: [phone]  Follow Up Time:

## 2020-11-23 NOTE — DISCHARGE NOTE PROVIDER - NSDCMRMEDTOKEN_GEN_ALL_CORE_FT
Acidophilus oral capsule: 1 cap(s) orally 2 times a day   amLODIPine 5 mg oral tablet: 1 tab(s) orally once a day  amoxicillin-clavulanate 875 mg-125 mg oral tablet: 1 tab(s) orally 2 times a day   Colace 100 mg oral capsule: 1 cap(s) orally 3 times a day   latanoprost 0.005% ophthalmic solution: 1 drop(s) to each affected eye once a day (in the evening)  losartan 100 mg oral tablet: 1 tab(s) orally once a day  Multiple Vitamins oral tablet: 1 tab(s) orally once a day  oxybutynin 10 mg/24 hr oral tablet, extended release: 1 tab(s) orally once a day  oxyCODONE 5 mg oral tablet: 1 tab(s) orally every 6 hours MDD:4  Vitamin D3 1000 intl units oral tablet: 1 tab(s) orally once a day

## 2020-11-23 NOTE — PROGRESS NOTE ADULT - PROBLEM SELECTOR PLAN 9
[place] on CKD stage 3   cr downtrending 1.4, encouraged po hydration and close follow up with pmd  avooid nephrotoxins  trend renal function  encourage po,  monitor i/os  if continues to uptrend cosnider nephro consult

## 2020-11-23 NOTE — DISCHARGE NOTE PROVIDER - NSDCFUADDAPPT_GEN_ALL_CORE_FT
one week with Dr. Healy Please schedule follow up appointment to be seen by Dr. Healy on Wednesday, 11/25/2020

## 2020-11-23 NOTE — DISCHARGE NOTE PROVIDER - NSDCCPCAREPLAN_GEN_ALL_CORE_FT
PRINCIPAL DISCHARGE DIAGNOSIS  Diagnosis: Acute cholecystitis  Assessment and Plan of Treatment:

## 2020-11-23 NOTE — DISCHARGE NOTE PROVIDER - NSDCFUADDINST_GEN_ALL_CORE_FT
Contact your healthcare provider if you have a fever, your wound is red, swollen, or draining pus, you have nausea or are vomiting, you have trouble urinating or feel burning when you urinate, have questions or concerns about your condition or care.    Seek care immediately or call 911 if you feel lightheaded, short of breath, and have chest pain, cough up blood, your arm or leg feels warm, tender, painful, or if blood soaks through your bandage, stitches come apart, have severe pain, unable to have a bowel movement or pass gas, your abdomen is swollen and hard, you cannot eat without vomiting, or see blood in your bowel movement or on your toilet paper.

## 2020-11-23 NOTE — PROGRESS NOTE ADULT - ATTENDING COMMENTS
I saw and examined the patient personally. Spoke with above provider regarding this case. I reviewed the above findings completely.  I agree with the above history, physical, and plan which I have edited where appropriate.
I saw and examined the patient personally. Spoke with above provider regarding this case. I reviewed the above findings completely.  I agree with the above history, physical, and plan which I have edited where appropriate.
The patient was personally seen and examined, in addition to being examined and evaluated by NP.  All elements of the note were edited where appropriate.
pt appears medically stable for discharge with close follow up with pmd and gen surgery    thank you for the courtesy of this consultation!

## 2020-11-23 NOTE — PROGRESS NOTE ADULT - PROBLEM SELECTOR PLAN 2
sec to npo, diet advancing  repleted with 40meq  check mag and phosph if still low tomorrow  trend and replete prn

## 2020-11-23 NOTE — PROGRESS NOTE ADULT - SUBJECTIVE AND OBJECTIVE BOX
Patient is a 70y old  Male who presents with a chief complaint of acute cholecystitis (23 Nov 2020 10:06)      INTERVAL HPI:  OVERNIGHT EVENTS:  T(F): 97.9 (11-23-20 @ 09:30), Max: 99.5 (11-22-20 @ 13:14)  HR: 78 (11-23-20 @ 09:30) (63 - 79)  BP: 142/82 (11-23-20 @ 09:30) (124/77 - 145/76)  RR: 17 (11-23-20 @ 09:30) (17 - 18)  SpO2: 96% (11-23-20 @ 09:30) (93% - 96%)  I&O's Summary    22 Nov 2020 07:01  -  23 Nov 2020 07:00  --------------------------------------------------------  IN: 2040 mL / OUT: 952 mL / NET: 1088 mL        REVIEW OF SYSTEMS:  CONSTITUTIONAL: No fever, weight loss, or fatigue  EYES: No eye pain, visual disturbances, or discharge  ENMT:  No difficulty hearing, tinnitus, vertigo; No sinus or throat pain  NECK: No pain or stiffness  BREASTS: No pain, masses, or nipple discharge  RESPIRATORY: No cough, wheezing, chills or hemoptysis; No shortness of breath  CARDIOVASCULAR: No chest pain, palpitations, dizziness, or leg swelling  GASTROINTESTINAL: No abdominal or epigastric pain. No nausea, vomiting, or hematemesis; No diarrhea or constipation. No melena or hematochezia.  GENITOURINARY: No dysuria, frequency, hematuria, or incontinence  NEUROLOGICAL: No headaches, memory loss, loss of strength, numbness, or tremors  SKIN: No itching, burning, rashes, or lesions   LYMPH NODES: No enlarged glands  ENDOCRINE: No heat or cold intolerance; No hair loss  MUSCULOSKELETAL: No joint pain or swelling; No muscle, back, or extremity pain  PSYCHIATRIC: No depression, anxiety, mood swings, or difficulty sleeping  HEME/LYMPH: No easy bruising, or bleeding gums  ALLERY AND IMMUNOLOGIC: No hives or eczema    PHYSICAL EXAM:  GENERAL: NAD, well-groomed, well-developed  HEAD:  Atraumatic, Normocephalic  EYES: EOMI, PERRLA, conjunctiva and sclera clear  ENMT: No tonsillar erythema, exudates, or enlargement; Moist mucous membranes, Good dentition, No lesions  NECK: Supple, No JVD, Normal thyroid  NERVOUS SYSTEM:  Alert & Oriented X3, Good concentration; Motor Strength 5/5 B/L upper and lower extremities; DTRs 2+ intact and symmetric  CHEST/LUNG: Clear to percussion bilaterally; No rales, rhonchi, wheezing, or rubs  HEART: Regular rate and rhythm; No murmurs, rubs, or gallops  ABDOMEN: Soft, Nontender, Nondistended; Bowel sounds present  EXTREMITIES:  2+ Peripheral Pulses, No clubbing, cyanosis, or edema  LYMPH: No lymphadenopathy noted  SKIN: No rashes or lesions    LABS:                        11.0   9.60  )-----------( 246      ( 23 Nov 2020 07:33 )             32.8     11-23    143  |  108  |  22  ----------------------------<  99  3.3<L>   |  30  |  1.40<H>    Ca    8.4<L>      23 Nov 2020 07:33    TPro  6.1  /  Alb  2.4<L>  /  TBili  0.6  /  DBili  x   /  AST  13<L>  /  ALT  24  /  AlkPhos  47  11-22        CAPILLARY BLOOD GLUCOSE      POCT Blood Glucose.: 179 mg/dL (23 Nov 2020 09:05)      11-20 @ 05:30   No growth to date.  --  --          MEDICATIONS  (STANDING):  acetaminophen   Tablet .. 650 milliGRAM(s) Oral every 6 hours  amLODIPine   Tablet 5 milliGRAM(s) Oral daily  enoxaparin Injectable 40 milliGRAM(s) SubCutaneous daily  influenza   Vaccine 0.5 milliLiter(s) IntraMuscular once  lactated ringers. 1000 milliLiter(s) (75 mL/Hr) IV Continuous <Continuous>  loratadine 10 milliGRAM(s) Oral daily  pantoprazole    Tablet 40 milliGRAM(s) Oral before breakfast  piperacillin/tazobactam IVPB.. 3.375 Gram(s) IV Intermittent every 8 hours  senna 2 Tablet(s) Oral at bedtime    MEDICATIONS  (PRN):       Patient is a 70y old  Male who presents with a chief complaint of acute cholecystitis (23 Nov 2020 10:06)      INTERVAL HPI: Pt seen and examined. States he is feeling much better and looking forward to going home soon. Denies any acute complaints at this time.     OVERNIGHT EVENTS: none noted  T(F): 97.9 (11-23-20 @ 09:30), Max: 99.5 (11-22-20 @ 13:14)  HR: 78 (11-23-20 @ 09:30) (63 - 79)  BP: 142/82 (11-23-20 @ 09:30) (124/77 - 145/76)  RR: 17 (11-23-20 @ 09:30) (17 - 18)  SpO2: 96% (11-23-20 @ 09:30) (93% - 96%)  I&O's Summary    22 Nov 2020 07:01  -  23 Nov 2020 07:00  --------------------------------------------------------  IN: 2040 mL / OUT: 952 mL / NET: 1088 mL        REVIEW OF SYSTEMS:  CONSTITUTIONAL: No fever, weight loss, or fatigue  RESPIRATORY: No cough, wheezing, chills or hemoptysis; No shortness of breath  CARDIOVASCULAR: No chest pain, palpitations, dizziness, or leg swelling  GASTROINTESTINAL: No abdominal or epigastric pain. No nausea, vomiting, or hematemesis; No diarrhea or constipation. No melena or hematochezia.  GENITOURINARY: No dysuria, frequency, hematuria, or incontinence  NEUROLOGICAL: No headaches, memory loss, loss of strength, numbness, or tremors  SKIN: No itching, burning, rashes, or lesions   MUSCULOSKELETAL: No joint pain or swelling; No muscle, back, or extremity pain  PSYCHIATRIC: No depression, anxiety, mood swings, or difficulty sleeping      PHYSICAL EXAM:  GENERAL: NAD, well-groomed, obese  NERVOUS SYSTEM:  Alert & Oriented X3, Good concentration; Motor Strength 5/5 B/L upper and lower extremities; DTRs 2+ intact and symmetric  CHEST/LUNG: Clear to auscultation bilaterally; No rales, rhonchi, wheezing, or rubs  HEART: Regular rate and rhythm; No murmurs, rubs, or gallops  ABDOMEN: Soft, Nontender, distended; Bowel sounds present, incisions appear c/d/i  EXTREMITIES:  2+ Peripheral Pulses, No clubbing, cyanosis, or edema  SKIN: No rashes or lesions, lap incision c/d/i    LABS:                        11.0   9.60  )-----------( 246      ( 23 Nov 2020 07:33 )             32.8     11-23    143  |  108  |  22  ----------------------------<  99  3.3<L>   |  30  |  1.40<H>    Ca    8.4<L>      23 Nov 2020 07:33    TPro  6.1  /  Alb  2.4<L>  /  TBili  0.6  /  DBili  x   /  AST  13<L>  /  ALT  24  /  AlkPhos  47  11-22        CAPILLARY BLOOD GLUCOSE      POCT Blood Glucose.: 179 mg/dL (23 Nov 2020 09:05)      11-20 @ 05:30   No growth to date.  --  --          MEDICATIONS  (STANDING):  acetaminophen   Tablet .. 650 milliGRAM(s) Oral every 6 hours  amLODIPine   Tablet 5 milliGRAM(s) Oral daily  enoxaparin Injectable 40 milliGRAM(s) SubCutaneous daily  influenza   Vaccine 0.5 milliLiter(s) IntraMuscular once  lactated ringers. 1000 milliLiter(s) (75 mL/Hr) IV Continuous <Continuous>  loratadine 10 milliGRAM(s) Oral daily  pantoprazole    Tablet 40 milliGRAM(s) Oral before breakfast  piperacillin/tazobactam IVPB.. 3.375 Gram(s) IV Intermittent every 8 hours  senna 2 Tablet(s) Oral at bedtime    MEDICATIONS  (PRN):

## 2020-11-23 NOTE — PROGRESS NOTE ADULT - ASSESSMENT
69 y/o M with PMH of HTN, overactive bladder, MVP, peripheral neuropathy, hx of bilateral detached retinas, presents with abdominal pain x 2 days, admitted with cholecystitis Consulted for presurgical optimization. Patient now s/p partial cholecystectomy with RODRIGUEZ drain.     Hypertension  - BP: 137/87 (11-23-20 @ 05:05) (124/77 - 147/72)  - Home losartan 100  on hold.   - Resumed amlodipine   - Monitor routine hemodynamics  - Cardiac cath in 2018 showed no flow-limiting lesions, non obstructive CAD    Cholecystitis   - Tolerated procedure well, cardiac status optimal post operatively  - Euvolemic on exam.  - Continue DVT prophylaxis  - Pain control  - Encourage Incentive Spirometry    Acute Kidney injury  - Improving  - Hold Losartan 2/2 KATHLEEN  - Continue to monitor renal indices  - Avoid nephrotoxins     - Monitor and replete lytes, keep K>4, Mg>2.  - All other medical needs as per primary team.  - Other cardiovascular workup will depend on clinical course.  - Will continue to follow.    Cm Remy DNP, ANP-c  Cardiology   Spectra #[phone]
69 y/o M with PMHx of HTN, overactive bladder, MVP, peripheral neuropathy, hx of bilateral detached retinas, presents with abdominal pain x 2 days, admitted with cholecystitis Consulted for presurgical optimization. Patient now s/p partial cholecystectomy with RODRIGUEZ drain.     Hypertension  - BP: 133/73 (11-21-20 @ 05:08) (107/62 - 133/73)  - Home losartan 100 and amlodipine 5 mg on hold   - Resume as tolerates PO   - Monitor routine hemodynamics  - Cardiac cath in 2018 showed no flow-limiting lesions, non obstructive CAD    Cholecystitis   - Tolerated procedure well, cardiac status optimal post operatively  - Euvolemic on exam.  - Continue DVT prophylaxis  - Pain control  - Encourage Incentive Spirometry    - Monitor and replete lytes, keep K>4, Mg>2.  - All other medical needs as per primary team.  - Other cardiovascular workup will depend on clinical course.  - Will continue to follow.    Steven Johnson MS FNP, [facility]  Nurse Practitioner- Cardiology   Spectra #[phone]
69 y/o M with hx of HTN, trace MVP, overactive bladder, b/l detached retina repair, partial neuropathy in bilateral feet ( due to hx spinal compression), HLD admit for acute  cholecystitis, s/p lap karma POD#2
71 y/o M with PMH of HTN, overactive bladder, MVP, peripheral neuropathy, hx of bilateral detached retinas, presents with abdominal pain x 2 days, admitted with cholecystitis Consulted for presurgical optimization. Patient now s/p partial cholecystectomy with RODRIGUEZ drain.     Hypertension  - BP: 147/72 (11-22-20 @ 09:55) (114/68 - 147/72)  - Home losartan 100 and amlodipine 5 mg on hold.   - Resume amlodipine when tolerates PO  - Monitor routine hemodynamics  - Cardiac cath in 2018 showed no flow-limiting lesions, non obstructive CAD    Cholecystitis   - Tolerated procedure well, cardiac status optimal post operatively  - Euvolemic on exam.  - Continue DVT prophylaxis  - Pain control  - Encourage Incentive Spirometry    Acute Kidney injury  - Creatinine trend:  <--1.60,  <--1.30,  <--1.40,  <--1.30  - Hold Losartan 2/2 KATHLEEN  - Continue to monitor renal indices  - Avoid nephrotoxins     - Monitor and replete lytes, keep K>4, Mg>2.  - All other medical needs as per primary team.  - Other cardiovascular workup will depend on clinical course.  - Will continue to follow.    Steven Johnson, MS FNP, [facility]  Nurse Practitioner- Cardiology   Spectra #[phone]  
71 y/o M with hx of HTN, trace MVP, overactive bladder, b/l detached retina repair, partial neuropathy in bilateral feet ( due to hx spinal compression), HLD admit for acute  cholecystitis, s/p lap karma POD#1
Patient is a 71 y/o M with hx of HTN, trace MVP, overactive bladder, b/l detached retina repair, partial neuropathy in bilateral feet ( due to hx spinal compression), HLD admit for acute  cholecystitis.
Covering Dr. Healy:    Chart reviewed, patient seen and examined as per Rosio RAJAN's note.  Sitting up in chair.  Operative findings discussed with patient.  RODRIGUEZ serosanguineous, Slater out.  Incisions clean.  Leukocytosis nicely trending down.    Will advance to clears.
71 y/o M with hx of HTN, trace MVP, overactive bladder, b/l detached retina repair, partial neuropathy in bilateral feet ( due to hx spinal compression), HLD admit for acute  cholecystitis, s/p lap karma POD#3
All as above in PA notation.  Patient personally seen and examined.  H & P reviewed.  Documents since admission noted.  History, exam, labs and imaging all consistent with acute cholecystitis- no obvious gangrene or perforation, though clinical picture concerning.  Short trial of NPO/ IVF/ IV Abx and pain medication overnight without meaningful response.  As such, intervention is indicated and appropriate.  Risks, benefits, nature of procedure reviewed with patient in detail in person and wife in detail by phone.  These points included but were not limited to:  -General anesthesia with intubation.  -Nature of laparoscopy, plan for laparoscopic cholecystectomy  -Possible conversion to open surgery with open cholecystectomy  -Given concern for complicated cholecystitis with difficult anatomy given fever, markedly elevated WBCs, extra dialogue and attention spent on possible subtotal cholecystectomy, possible cholecystostomy, possible discontinued procedure  -We have specifically discussed open wounds, cholecystostomy tubes, use of drains  -We have specifically discussed the possibility of visceral, vascular or biliary injury and the steps to avoid it  -We have specifically discussed the possibility of further future return to OR for interval cholecystectomy if cholecystostomy is performed.  Consent personally obtained.  Mr. Goldberger shows good insight and is eager to proceed.  Mrs. Goldberger shows good insight and has no questions.  Plan is now for OR today.  
All as above in PA notation.  Patient personally seen and examined.  Vitals non-suggestive.   RODRIGUEZ output reassuring in character and quantity.  Abdomen benign.  Dressings dry and clean.  Labs WNL or normalizing.  Pathology still pending.  However, patient in excellent spirits.  Denies pain now.  Tolerating regular diet.  Voiding without issue.  +BM.   As such, as moving better and emptying RODRIGUEZ, seems appropriate for discharge to home.  Encouraged to call with questions or concerns or changes- especially pertaining to any issues with RODRIGUEZ.  Otherwise, for office follow-up later this week.  Patient in pleased and free of concern.  Wife is pleased and free of question.  To home this pm if tolerating diet.  I remain available.  All instructions reviewed personally with patient and then via phone with spouse.
Patient seen and examined with MOHINI Cordero.  WBC trending down nicely.  Although had BM and passing flatus, (small amounts), abdomen appears more distended compared to yesterday.  Therefore will maintain full liquids as a conservative approach.

## 2020-11-24 PROBLEM — N32.81 OVERACTIVE BLADDER: Chronic | Status: ACTIVE | Noted: 2020-11-19

## 2020-11-24 LAB — SURGICAL PATHOLOGY STUDY: SIGNIFICANT CHANGE UP

## 2020-11-25 ENCOUNTER — APPOINTMENT (OUTPATIENT)
Dept: SURGERY | Facility: CLINIC | Age: 70
End: 2020-11-25
Payer: MEDICARE

## 2020-11-25 VITALS
WEIGHT: 264.55 LBS | SYSTOLIC BLOOD PRESSURE: 132 MMHG | HEART RATE: 77 BPM | HEIGHT: 74 IN | OXYGEN SATURATION: 97 % | DIASTOLIC BLOOD PRESSURE: 82 MMHG | TEMPERATURE: 97.1 F | BODY MASS INDEX: 33.95 KG/M2

## 2020-11-25 LAB
CULTURE RESULTS: SIGNIFICANT CHANGE UP
CULTURE RESULTS: SIGNIFICANT CHANGE UP
SPECIMEN SOURCE: SIGNIFICANT CHANGE UP
SPECIMEN SOURCE: SIGNIFICANT CHANGE UP

## 2020-11-25 PROCEDURE — 99024 POSTOP FOLLOW-UP VISIT: CPT

## 2020-11-25 NOTE — PHYSICAL EXAM
[Respiratory Effort] : normal respiratory effort [Normal Rate and Rhythm] : normal rate and rhythm [No HSM] : no hepatosplenomegaly [Tender] : was nontender [Enlarged] : not enlarged [No Rash or Lesion] : No rash or lesion [Alert] : alert [Oriented to Person] : oriented to person [Oriented to Place] : oriented to place [Oriented to Time] : oriented to time [Anxious] : anxious [de-identified] : Appears well, no acute distress, ambulates easily into office and assumes examination table without need of assistance. [de-identified] : Normocephalic and atraumatic. [de-identified] : Supple with full range of motion. [FreeTextEntry1] : No cervical, supraclavicular, axillary or inguinal adenopathy. [de-identified] : No visible masses or palpable lesions. [de-identified] : Soft and non tense and non tender.\par RUQ without visible mass.\par Epigastrium without palpable mass.\par Entire abdomen soft and non tender.\par Incisions clean and dry and intact.\par RODRIGUEZ with scant serosang. output- no bile or purulence or necrotic debris. [de-identified] : Normal external genitalia. [de-identified] : Deferred. [de-identified] : Grossly symmetric and within normal limits without any obvious motor or sensory deficits. [de-identified] : though appropriately so...

## 2020-11-25 NOTE — REVIEW OF SYSTEMS
[Feeling Poorly] : not feeling poorly [Feeling Tired] : feeling tired [As Noted in HPI] : as noted in HPI [Anxiety] : no anxiety [Depression] : no depression [Negative] : Heme/Lymph

## 2020-11-25 NOTE — HISTORY OF PRESENT ILLNESS
[de-identified] : Very pleasant gentleman returns in great spirits.\par Now s/p hospitalization with laparoscopic fenestrating subtotal cholecystectomy.\par He reports no issues with drain or drain site.\par He admits to minimal incisional pain and no more significant abdominal discomfort.\par Mr. Goldberger reports more formed stools since discharge and improving bladder irritation with initiation of his usual medication regimen...

## 2020-11-25 NOTE — PLAN
[FreeTextEntry1] : S/P uneventful general anesthesia with uncomplicated but more involved laparoscopic fenestrating subtotal cholecystectomy for acute cholecystitis .\par Clinically well by this history.  Surgically stable by this examination.\par No evidence by history or examination to suggest complication.  RODRIGUEZ removed without issue.\par Cleared to resume casual activities with own comfort as guide.\par To refrain from maximal exertions for another month.\par To return to office in ~2 weeks for more formal/ routine post-operative appointment- seen early today for drain.\par Pathology report and it's benign nature reviewed in detail.\par He will complete the oral ABX as directed in interm.\par Otherwise, encouraged to call with questions or concerns.\par Mr. Goldberger is pleased and agrees, leaving in good spirits able to verbalize instructions as outlined above.\par

## 2020-11-25 NOTE — DATA REVIEWED
[FreeTextEntry1] : GOLDBERGER, MARTIN M                  2\par Surgical Final Report\par Final Diagnosis\par Gallbladder, laparoscopic subtotal cholecystectomy:\par Acute necrotizing and hemorrhagic cholecystitis.\par Verified by: Raúl Fregoso M.D.  (Electronic Signature)\par Reported on: 11/24/20 15:16 [facility], [facility], [address] [place] [address]\par Phone: [phone]   Fax: [phone]\par _________________________________________________________________

## 2020-12-08 ENCOUNTER — APPOINTMENT (OUTPATIENT)
Dept: SURGERY | Facility: CLINIC | Age: 70
End: 2020-12-08
Payer: MEDICARE

## 2020-12-08 VITALS
SYSTOLIC BLOOD PRESSURE: 120 MMHG | WEIGHT: 256.83 LBS | TEMPERATURE: 97.4 F | DIASTOLIC BLOOD PRESSURE: 70 MMHG | HEART RATE: 82 BPM | HEIGHT: 74 IN | OXYGEN SATURATION: 99 % | BODY MASS INDEX: 32.96 KG/M2

## 2020-12-08 PROCEDURE — 99024 POSTOP FOLLOW-UP VISIT: CPT

## 2020-12-10 RX ORDER — OXYBUTYNIN CHLORIDE 2.5 MG/1
TABLET ORAL
Refills: 0 | Status: ACTIVE | COMMUNITY

## 2020-12-10 RX ORDER — MELATONIN 5 MG
TABLET,CHEWABLE ORAL
Qty: 14 | Refills: 0 | Status: ACTIVE | COMMUNITY
Start: 2020-11-23

## 2020-12-10 RX ORDER — LOSARTAN POTASSIUM 100 MG/1
100 TABLET, FILM COATED ORAL
Refills: 0 | Status: ACTIVE | COMMUNITY

## 2020-12-10 RX ORDER — UBIDECARENONE 200 MG
CAPSULE ORAL
Refills: 0 | Status: ACTIVE | COMMUNITY

## 2020-12-10 RX ORDER — LATANOPROST/PF 0.005 %
0.01 DROPS OPHTHALMIC (EYE)
Refills: 0 | Status: ACTIVE | COMMUNITY

## 2020-12-10 RX ORDER — CALCIUM CARBONATE 260MG(650)
TABLET,CHEWABLE ORAL
Refills: 0 | Status: ACTIVE | COMMUNITY

## 2020-12-10 RX ORDER — OXYBUTYNIN CHLORIDE 10 MG/1
10 TABLET, EXTENDED RELEASE ORAL
Qty: 90 | Refills: 0 | Status: ACTIVE | COMMUNITY
Start: 2020-08-29

## 2020-12-10 RX ORDER — MULTIVITAMIN
TABLET ORAL
Refills: 0 | Status: ACTIVE | COMMUNITY

## 2020-12-10 RX ORDER — ASPIRIN 81 MG
81 TABLET, DELAYED RELEASE (ENTERIC COATED) ORAL
Refills: 0 | Status: ACTIVE | COMMUNITY

## 2020-12-10 RX ORDER — AMLODIPINE BESYLATE 5 MG/1
5 TABLET ORAL
Refills: 0 | Status: ACTIVE | COMMUNITY

## 2020-12-10 RX ORDER — DOCUSATE SODIUM 100 MG/1
100 CAPSULE, LIQUID FILLED ORAL
Qty: 21 | Refills: 0 | Status: ACTIVE | COMMUNITY
Start: 2020-11-23

## 2020-12-10 NOTE — PHYSICAL EXAM
[Respiratory Effort] : normal respiratory effort [Normal Rate and Rhythm] : normal rate and rhythm [No HSM] : no hepatosplenomegaly [Tender] : was nontender [Enlarged] : not enlarged [No Rash or Lesion] : No rash or lesion [Alert] : alert [Oriented to Person] : oriented to person [Oriented to Place] : oriented to place [Oriented to Time] : oriented to time [Anxious] : anxious [de-identified] : Appears well and in no acute distress, ambulates easily into office and assumes examination table without assist. [de-identified] : Remains normocephalic and atraumatic. [de-identified] : Still supple with full range of motion. [FreeTextEntry1] : No cervical, supraclavicular, axillary or inguinal adenopathy on this exam. [de-identified] : Deferred. [de-identified] : Soft, non tense and non tender.\par RUQ remains without visible mass.\par Epigastrium stable without palpable mass.\par Entire abdomen soft and non tender today.\par Incisions all clean, dry and intact.\par No expressible drainage or appreciable odor.\par No SQ collections to suggest hematoma or seroma or abscess.\par No fascial defects to suggest trocar site or drain site herniation. [de-identified] : Normal external genitalia. [de-identified] : Deferred. [de-identified] : Grossly symmetric, within normal limits, no obvious motor or sensory deficits. [de-identified] : though appropriately so regarding his overall progress...

## 2020-12-10 NOTE — HISTORY OF PRESENT ILLNESS
[de-identified] : Very pleasant gentleman returns in great spirits.\par Now s/p hospitalization with laparoscopic fenestrating subtotal cholecystectomy.\par He reports no issues with drain or drain site.\par He admits to minimal incisional pain and no more significant abdominal discomfort.\par Mr. Goldberger reports more formed stools since discharge and improving bladder irritation with initiation of his usual medication regimen... [de-identified] : Very pleasant gentleman who returns in great spirits for ongoing General Surgery follow-up.\par Now s/p hospitalization with laparoscopic fenestrating subtotal cholecystectomy for severe acute cholecystitis.\par Seen last week for removal of drain.\par Now reports to issues with prior drain site.\par Fortunately, he continues to deny fever or chills.\par Unfortunately, he reports continued fatigue.\par He denies any actual abdominal pain and his bladder irritation is improving.\par He feels his appetite is lacking, but he denies any specific upper or lower GI complaints except for "gassiness" with especially larger or more fat laden meals...

## 2020-12-10 NOTE — PLAN
[FreeTextEntry1] : Now S/P uneventful anesthesia with laparoscopic fenestrating subtotal cholecystectomy for acute cholecystitis .\par Clinically well overall by this history once again.  Surgically stable by this examination.\par No evidence by history or exam to suggest complication.  Cleared for casual activities with comfort as guide.\par To refrain from maximal exertions for another month.  Encouraged to call with questions or concerns at any time.\par He appears well with benign exam.\par However, in light of fatigue and loss of appetite, will RTO next week for close follow-up.\par Should there be any interval additional complaint or failure to clearly progress, then consideration can be given to routine labs around next visit.  Any complaints of fever, chills or pain, may require more prompt imaging. \par Mr. Goldberger is pleased and agrees, leaving today in good spirits able to verbalize instructions as outlined above.  We have emphasized the importance of small and more frequent meals with an emphasis on low fat for the present.\par

## 2020-12-10 NOTE — DATA REVIEWED
[FreeTextEntry1] : GOLDBERGER, MARTIN M                  2\par Surgical Final Report\par Final Diagnosis\par Gallbladder, laparoscopic subtotal cholecystectomy:\par Acute necrotizing and hemorrhagic cholecystitis.\par Verified by: Raúl Fregoso M.D.  (Electronic Signature)\par Reported on: 11/24/20 15:16 [facility], [facility], [address] [place] [address]\par Phone: [phone]   Fax: [phone]\par _________________________________________________________________

## 2020-12-15 ENCOUNTER — APPOINTMENT (OUTPATIENT)
Dept: SURGERY | Facility: CLINIC | Age: 70
End: 2020-12-15
Payer: MEDICARE

## 2020-12-15 VITALS
BODY MASS INDEX: 32.93 KG/M2 | DIASTOLIC BLOOD PRESSURE: 80 MMHG | HEART RATE: 73 BPM | WEIGHT: 256.61 LBS | SYSTOLIC BLOOD PRESSURE: 140 MMHG | TEMPERATURE: 97.2 F | HEIGHT: 74 IN | OXYGEN SATURATION: 99 %

## 2020-12-15 PROCEDURE — 99024 POSTOP FOLLOW-UP VISIT: CPT

## 2020-12-15 NOTE — PHYSICAL EXAM
[Respiratory Effort] : normal respiratory effort [Normal Rate and Rhythm] : normal rate and rhythm [No HSM] : no hepatosplenomegaly [Tender] : was nontender [Enlarged] : not enlarged [No Rash or Lesion] : No rash or lesion [Alert] : alert [Oriented to Person] : oriented to person [Oriented to Place] : oriented to place [Oriented to Time] : oriented to time [Anxious] : anxious [de-identified] : Appears well, no acute distress, ambulates easily into office and assumes examination table without assist. [de-identified] : Remains normocephalic and atraumatic. [de-identified] : Still supple with full range of motion today. [FreeTextEntry1] : No cervical, supraclavicular, axillary or inguinal adenopathy on this exam now. [de-identified] : Deferred. [de-identified] : Soft, non tense and non tender.\par RUQ remains without visible mass today.\par Epigastrium stable without palpable mass by this exam.\par Entire abdomen soft and non tender once again.\par Incisions all clean and dry and intact.\par No expressible drainage or appreciable odor presently.\par No SQ collections to suggest hematoma, seroma or abscess.\par No fascial defects to suggest trocar site herniation. [de-identified] : Normal external genitalia. [de-identified] : Deferred. [de-identified] : Grossly symmetric, within normal limits, no obvious motor or sensory deficits. [de-identified] : though appropriately so regarding his overall progress...

## 2020-12-15 NOTE — DATA REVIEWED
[No studies available for review at this time.] : No studies available for review at this time. [FreeTextEntry1] : No new data since last visit.\par \par GOLDBERGER, MARTIN M                  2\par Surgical Final Report\par Final Diagnosis\par Gallbladder, laparoscopic subtotal cholecystectomy:\par Acute necrotizing and hemorrhagic cholecystitis.\par Verified by: Raúl Fregoso M.D.  (Electronic Signature)\par Reported on: 11/24/20 15:16 [facility], [facility], [address] [place] [address], [place] [address]\par Phone: [phone]   Fax: [phone]\par _________________________________________________________________

## 2020-12-15 NOTE — PLAN
[FreeTextEntry1] : S/P laparoscopic fenestrating subtotal cholecystectomy for acute cholecystitis .\par Clinically well overall by this history once again today.    Surgically stable by this reassuring examination.\par No evidence by history or exam to suggest complication.  \par Cleared to advance activities with comfort as guide.\par Encouraged to call with questions or concerns at any time.\par While he is admittedly "improving" per his own report, he remains very concerned regarding his overall progress.\par As such, I have provided him reassurance, though given the degree of both the local inflammatory changes with his acute cholecystitis and the systemic response, I think that blood work is appropriate.\par Mr. Goldberger is pleased, agrees and leaves in good spirits with instructions as above.  \par I remain available.  We have agreed that he may continue to broaden his diet.  Timing of next visit pending further clinical progress and laboratory work.\par

## 2020-12-15 NOTE — REVIEW OF SYSTEMS
[Feeling Poorly] : not feeling poorly [Feeling Tired] : not feeling tired [As Noted in HPI] : as noted in HPI [Anxiety] : no anxiety [Depression] : no depression [Negative] : Heme/Lymph [FreeTextEntry8] : Improving frequency...

## 2020-12-15 NOTE — HISTORY OF PRESENT ILLNESS
[de-identified] : Very pleasant gentleman returns in great spirits.\par Now s/p hospitalization with laparoscopic fenestrating subtotal cholecystectomy.\par He reports no issues with drain or drain site.\par He admits to minimal incisional pain and no more significant abdominal discomfort.\par Mr. Goldberger reports more formed stools since discharge and improving bladder irritation with initiation of his usual medication regimen... [de-identified] : Very pleasant gentleman who returns in great spirits for ongoing follow-up.\par S/P hospitalization with laparoscopic fenestrating subtotal cholecystectomy.\par He presents with a lengthy and detailed history of his progress since last visit.\par Fortunately, he continues to deny fever or chills and reports much less fatigue, an improving appetite and no abdominal pain today.\par He denies any specific upper or lower GI complaints except for "gassiness" which is less so, despite adding to his portion size and broadening his fat intake...

## 2020-12-17 LAB
ALBUMIN SERPL ELPH-MCNC: 4.5 G/DL
ALP BLD-CCNC: 66 U/L
ALT SERPL-CCNC: 20 U/L
ANION GAP SERPL CALC-SCNC: 14 MMOL/L
AST SERPL-CCNC: 18 U/L
BASOPHILS # BLD AUTO: 0.06 K/UL
BASOPHILS NFR BLD AUTO: 0.5 %
BILIRUB SERPL-MCNC: 0.4 MG/DL
BUN SERPL-MCNC: 20 MG/DL
CALCIUM SERPL-MCNC: 9.4 MG/DL
CHLORIDE SERPL-SCNC: 103 MMOL/L
CO2 SERPL-SCNC: 23 MMOL/L
CREAT SERPL-MCNC: 1.39 MG/DL
EOSINOPHIL # BLD AUTO: 0.22 K/UL
EOSINOPHIL NFR BLD AUTO: 1.9 %
GLUCOSE SERPL-MCNC: 90 MG/DL
HCT VFR BLD CALC: 39.7 %
HGB BLD-MCNC: 12.4 G/DL
IMM GRANULOCYTES NFR BLD AUTO: 0.4 %
LYMPHOCYTES # BLD AUTO: 3.19 K/UL
LYMPHOCYTES NFR BLD AUTO: 27.1 %
MAN DIFF?: NORMAL
MCHC RBC-ENTMCNC: 28.5 PG
MCHC RBC-ENTMCNC: 31.2 GM/DL
MCV RBC AUTO: 91.3 FL
MONOCYTES # BLD AUTO: 0.68 K/UL
MONOCYTES NFR BLD AUTO: 5.8 %
NEUTROPHILS # BLD AUTO: 7.59 K/UL
NEUTROPHILS NFR BLD AUTO: 64.3 %
PLATELET # BLD AUTO: 377 K/UL
POTASSIUM SERPL-SCNC: 4.9 MMOL/L
PROT SERPL-MCNC: 6.7 G/DL
RBC # BLD: 4.35 M/UL
RBC # FLD: 12.8 %
SODIUM SERPL-SCNC: 141 MMOL/L
WBC # FLD AUTO: 11.79 K/UL

## 2020-12-29 ENCOUNTER — APPOINTMENT (OUTPATIENT)
Dept: SURGERY | Facility: CLINIC | Age: 70
End: 2020-12-29
Payer: MEDICARE

## 2020-12-29 VITALS
TEMPERATURE: 97.2 F | HEART RATE: 71 BPM | WEIGHT: 257.5 LBS | DIASTOLIC BLOOD PRESSURE: 68 MMHG | BODY MASS INDEX: 33.05 KG/M2 | HEIGHT: 74 IN | SYSTOLIC BLOOD PRESSURE: 120 MMHG | OXYGEN SATURATION: 97 %

## 2020-12-29 DIAGNOSIS — Z90.49 ACQUIRED ABSENCE OF OTHER SPECIFIED PARTS OF DIGESTIVE TRACT: ICD-10-CM

## 2020-12-29 DIAGNOSIS — Z87.19 PERSONAL HISTORY OF OTHER DISEASES OF THE DIGESTIVE SYSTEM: ICD-10-CM

## 2020-12-29 PROCEDURE — 99024 POSTOP FOLLOW-UP VISIT: CPT

## 2020-12-29 RX ORDER — AMOXICILLIN AND CLAVULANATE POTASSIUM 875; 125 MG/1; MG/1
875-125 TABLET, COATED ORAL
Qty: 8 | Refills: 0 | Status: DISCONTINUED | COMMUNITY
Start: 2020-11-23 | End: 2020-12-29

## 2020-12-29 RX ORDER — OXYCODONE 5 MG/1
5 TABLET ORAL
Qty: 12 | Refills: 0 | Status: DISCONTINUED | COMMUNITY
Start: 2020-11-23 | End: 2020-12-29

## 2020-12-29 RX ORDER — DOXYCYCLINE HYCLATE 100 MG/1
100 CAPSULE ORAL
Qty: 6 | Refills: 0 | Status: DISCONTINUED | COMMUNITY
Start: 2020-09-14 | End: 2020-12-29

## 2020-12-29 NOTE — HISTORY OF PRESENT ILLNESS
[de-identified] : Very pleasant gentleman returns in great spirits.\par Now s/p hospitalization with laparoscopic fenestrating subtotal cholecystectomy.\par He reports no issues with drain or drain site.\par He admits to minimal incisional pain and no more significant abdominal discomfort.\par Mr. Goldberger reports more formed stools since discharge and improving bladder irritation with initiation of his usual medication regimen... [de-identified] : Very pleasant gentleman who returns in great spirits for ongoing surgery follow-up.\par He presents again with a lengthy and detailed history of his progress since last visit.\par Fortunately, he continues to deny fever or chills and has no abdominal pain today.\par He feels his energy level and functional status are excellent.\par He still notes some "gassiness" but less so, despite his fairly high fat intake...\par He is awaiting follow-up with his Urologist, though his bladder spasms are improving!

## 2020-12-29 NOTE — DATA REVIEWED
[No studies available for review at this time.] : No studies available for review at this time. [FreeTextEntry1] : Labs of 12/15/2020 reviewed and discussed.

## 2020-12-29 NOTE — PLAN
[FreeTextEntry1] : S/P laparoscopic fenestrating subtotal cholecystectomy for acute cholecystitis .\par Clinically well overall by his history once again today.    \par Surgically stable by my reassuring examination.\par No evidence by history or exam to indicate complication.  \par Cleared to advance activities with comfort as guide and encouraged to call with concerns at any time.\par Plan for RTO in 1 month's time.\par If issues with "gas" continue, then can consider sonography and GI consultation.\par However, this seems likely attributable to the cholecystectomy and counseled to monitor fatty food intake.\par Mr. Goldberger is pleased, agrees and leaves in good spirit.  \par I remain available.\par

## 2020-12-29 NOTE — PHYSICAL EXAM
[Respiratory Effort] : normal respiratory effort [Normal Rate and Rhythm] : normal rate and rhythm [No HSM] : no hepatosplenomegaly [Tender] : was nontender [Enlarged] : not enlarged [No Rash or Lesion] : No rash or lesion [Alert] : alert [Oriented to Person] : oriented to person [Oriented to Place] : oriented to place [Oriented to Time] : oriented to time [Calm] : calm [de-identified] : Appears well and in no acute distress, ambulates easily into office once again. [de-identified] : Remains normocephalic and atraumatic. [de-identified] : Still supple with full ROM today. [FreeTextEntry1] : No cervical, supraclavicular, axillary or inguinal adenopathy on exam now. [de-identified] : Deferred. [de-identified] : Soft, still non tense and still non tender.\par RUQ remains without visible mass and epigastrium stable without palpable mass by this exam.\par Entire abdomen soft, non tender once again.\par Incisions all clean, dry,  intact.\par No expressible drainage or appreciable odor presently.\par No SQ collections to suggest hematoma, seroma or abscess.\par No fascial defects to indicate trocar site herniation. [de-identified] : Deferred. [de-identified] : Deferred. [de-identified] : Grossly symmetric, within normal limits, no motor or sensory deficits. [de-identified] : reassured by further progress to date...

## 2021-01-26 ENCOUNTER — APPOINTMENT (OUTPATIENT)
Dept: SURGERY | Facility: CLINIC | Age: 71
End: 2021-01-26

## 2024-02-08 NOTE — CONSULT NOTE ADULT - NSPROBSELRECBLANK_9_GEN
Pt lost NG tube overnite. General surgery instructed RN to hold off on replacing NG tube. Unable to give scheduled PO meds. NP Yenifer truong.   DISPLAY PLAN FREE TEXT

## 2024-04-23 NOTE — ED ADULT NURSE NOTE - NSFALLRSKASSESSDT_ED_ALL_ED
Constitutional: well appearing, NAD AAOx3  Eyes: EOMI, PERRL  Head: Normocephalic atraumatic  Mouth: no airway obstruction, posterior oropharynx clear without erythema or exudate  Neck: supple  Cardiac: regular rate and rhythm, no MRG, no chest wall tenderness or skin changes  Resp: Lungs CTAB  GI: Abd s/nt/nd  Neuro: CN2-12 intact, strength 5/5x4, sensation grossly intact  Skin: No rashes  MSK:   Left thoracic back tenderness without crepitus or obvious deformity, no midline tenderness of CTL spine 19-Nov-2020 18:22

## 2024-07-16 NOTE — H&P CARDIOLOGY - NEUROLOGICAL
negative Robot assisted cholecystectomy Alert & oriented; no sensory, motor or coordination deficits, normal reflexes